# Patient Record
Sex: MALE | Race: BLACK OR AFRICAN AMERICAN | Employment: FULL TIME | URBAN - METROPOLITAN AREA
[De-identification: names, ages, dates, MRNs, and addresses within clinical notes are randomized per-mention and may not be internally consistent; named-entity substitution may affect disease eponyms.]

---

## 2017-08-01 ENCOUNTER — OFFICE VISIT (OUTPATIENT)
Dept: FAMILY MEDICINE CLINIC | Age: 47
End: 2017-08-01
Payer: COMMERCIAL

## 2017-08-01 VITALS
TEMPERATURE: 98.5 F | HEART RATE: 72 BPM | HEIGHT: 67 IN | BODY MASS INDEX: 38.45 KG/M2 | RESPIRATION RATE: 20 BRPM | SYSTOLIC BLOOD PRESSURE: 124 MMHG | WEIGHT: 245 LBS | DIASTOLIC BLOOD PRESSURE: 90 MMHG

## 2017-08-01 DIAGNOSIS — I10 ESSENTIAL HYPERTENSION: ICD-10-CM

## 2017-08-01 DIAGNOSIS — Z13.220 SCREENING CHOLESTEROL LEVEL: ICD-10-CM

## 2017-08-01 DIAGNOSIS — Z12.5 SCREENING FOR PROSTATE CANCER: ICD-10-CM

## 2017-08-01 DIAGNOSIS — M79.651 RIGHT THIGH PAIN: Primary | ICD-10-CM

## 2017-08-01 PROCEDURE — 90715 TDAP VACCINE 7 YRS/> IM: CPT | Performed by: NURSE PRACTITIONER

## 2017-08-01 PROCEDURE — 90471 IMMUNIZATION ADMIN: CPT | Performed by: NURSE PRACTITIONER

## 2017-08-01 PROCEDURE — 99203 OFFICE O/P NEW LOW 30 MIN: CPT | Performed by: NURSE PRACTITIONER

## 2017-08-01 ASSESSMENT — PATIENT HEALTH QUESTIONNAIRE - PHQ9
1. LITTLE INTEREST OR PLEASURE IN DOING THINGS: 0
2. FEELING DOWN, DEPRESSED OR HOPELESS: 1
SUM OF ALL RESPONSES TO PHQ QUESTIONS 1-9: 1
SUM OF ALL RESPONSES TO PHQ9 QUESTIONS 1 & 2: 1

## 2017-08-07 ENCOUNTER — HOSPITAL ENCOUNTER (OUTPATIENT)
Dept: GENERAL RADIOLOGY | Age: 47
Discharge: HOME | End: 2017-08-07
Payer: COMMERCIAL

## 2017-08-07 ENCOUNTER — HOSPITAL ENCOUNTER (OUTPATIENT)
Age: 47
Discharge: HOME | End: 2017-08-07
Payer: COMMERCIAL

## 2017-08-07 DIAGNOSIS — I10 ESSENTIAL HYPERTENSION: ICD-10-CM

## 2017-08-07 DIAGNOSIS — M79.651 RIGHT THIGH PAIN: ICD-10-CM

## 2017-08-07 DIAGNOSIS — Z13.220 SCREENING CHOLESTEROL LEVEL: ICD-10-CM

## 2017-08-07 DIAGNOSIS — Z12.5 SCREENING FOR PROSTATE CANCER: ICD-10-CM

## 2017-08-07 LAB
ALBUMIN SERPL-MCNC: 3.9 G/DL (ref 3.5–5.1)
ALP BLD-CCNC: 62 U/L (ref 38–126)
ALT SERPL-CCNC: 14 U/L (ref 11–66)
ANION GAP SERPL CALCULATED.3IONS-SCNC: 8 MEQ/L (ref 8–16)
AST SERPL-CCNC: 13 U/L (ref 5–40)
BILIRUB SERPL-MCNC: 0.4 MG/DL (ref 0.3–1.2)
BUN BLDV-MCNC: 16 MG/DL (ref 7–22)
CALCIUM SERPL-MCNC: 9 MG/DL (ref 8.5–10.5)
CHLORIDE BLD-SCNC: 104 MEQ/L (ref 98–111)
CHOLESTEROL, TOTAL: 150 MG/DL (ref 100–199)
CO2: 28 MEQ/L (ref 23–33)
CREAT SERPL-MCNC: 0.8 MG/DL (ref 0.4–1.2)
GFR SERPL CREATININE-BSD FRML MDRD: > 90 ML/MIN/1.73M2
GLUCOSE BLD-MCNC: 96 MG/DL (ref 70–108)
HDLC SERPL-MCNC: 32 MG/DL
LDL CHOLESTEROL CALCULATED: 93 MG/DL
POTASSIUM SERPL-SCNC: 4.5 MEQ/L (ref 3.5–5.2)
PROSTATE SPECIFIC ANTIGEN: 0.58 NG/ML (ref 0–1)
SODIUM BLD-SCNC: 140 MEQ/L (ref 135–145)
TOTAL PROTEIN: 7.4 G/DL (ref 6.1–8)
TRIGL SERPL-MCNC: 126 MG/DL (ref 0–199)

## 2017-08-07 PROCEDURE — 80053 COMPREHEN METABOLIC PANEL: CPT

## 2017-08-07 PROCEDURE — 36415 COLL VENOUS BLD VENIPUNCTURE: CPT

## 2017-08-07 PROCEDURE — 80061 LIPID PANEL: CPT

## 2017-08-07 PROCEDURE — 73552 X-RAY EXAM OF FEMUR 2/>: CPT

## 2017-08-07 PROCEDURE — 84153 ASSAY OF PSA TOTAL: CPT

## 2017-08-08 ENCOUNTER — TELEPHONE (OUTPATIENT)
Dept: FAMILY MEDICINE CLINIC | Age: 47
End: 2017-08-08

## 2019-07-10 ENCOUNTER — HOSPITAL ENCOUNTER (EMERGENCY)
Age: 49
Discharge: HOME | End: 2019-07-10
Attending: EMERGENCY MEDICINE
Payer: COMMERCIAL

## 2019-07-10 VITALS
WEIGHT: 245 LBS | HEIGHT: 67 IN | OXYGEN SATURATION: 96 % | SYSTOLIC BLOOD PRESSURE: 150 MMHG | DIASTOLIC BLOOD PRESSURE: 80 MMHG | HEART RATE: 83 BPM | RESPIRATION RATE: 16 BRPM | BODY MASS INDEX: 38.45 KG/M2 | TEMPERATURE: 97.2 F

## 2019-07-10 DIAGNOSIS — L23.7 ALLERGIC CONTACT DERMATITIS DUE TO PLANT: Primary | ICD-10-CM

## 2019-07-10 PROCEDURE — 99213 OFFICE O/P EST LOW 20 MIN: CPT | Performed by: EMERGENCY MEDICINE

## 2019-07-10 PROCEDURE — 99212 OFFICE O/P EST SF 10 MIN: CPT

## 2019-07-10 RX ORDER — HYDROXYZINE 50 MG/1
50 TABLET, FILM COATED ORAL 4 TIMES DAILY PRN
Qty: 15 TABLET | Refills: 0 | Status: SHIPPED | OUTPATIENT
Start: 2019-07-10 | End: 2019-08-15

## 2019-07-10 RX ORDER — PREDNISONE 20 MG/1
20 TABLET ORAL DAILY
Qty: 18 TABLET | Refills: 0 | Status: SHIPPED | OUTPATIENT
Start: 2019-07-10 | End: 2019-07-26

## 2019-07-10 ASSESSMENT — ENCOUNTER SYMPTOMS
NAUSEA: 0
EYE DISCHARGE: 0
STRIDOR: 0
WHEEZING: 0
SHORTNESS OF BREATH: 0
VOICE CHANGE: 0
SORE THROAT: 0
VOMITING: 0
ABDOMINAL PAIN: 0
TROUBLE SWALLOWING: 0
SINUS PRESSURE: 0
EYE REDNESS: 0
DIARRHEA: 0
COUGH: 0
EYE PAIN: 0
BACK PAIN: 0

## 2019-07-10 NOTE — ED PROVIDER NOTES
MEDICATIONS       Discharge Medication List as of 7/10/2019 12:07 PM          ALLERGIES     Patient is has No Known Allergies. FAMILY HISTORY     Patient'sfamily history includes Diabetes in his mother and another family member; High Blood Pressure in his mother. SOCIAL HISTORY     Patient  reports that he has never smoked. He has never used smokeless tobacco. He reports that he does not drink alcohol or use drugs. PHYSICAL EXAM     ED TRIAGE VITALS  BP: (!) 150/80, Temp: 97.2 °F (36.2 °C), Pulse: 83, Resp: 16, SpO2: 96 %  Physical Exam   Constitutional: He is oriented to person, place, and time. He appears well-developed and well-nourished. No distress. Moist membranes, normal airway   HENT:   Head: Normocephalic and atraumatic. Right Ear: Tympanic membrane and external ear normal.   Left Ear: Tympanic membrane and external ear normal.   Nose: Nose normal. Right sinus exhibits no maxillary sinus tenderness and no frontal sinus tenderness. Left sinus exhibits no maxillary sinus tenderness and no frontal sinus tenderness. Mouth/Throat: Oropharynx is clear and moist. No trismus in the jaw. No uvula swelling. No oropharyngeal exudate, posterior oropharyngeal edema, posterior oropharyngeal erythema or tonsillar abscesses. Eyes: Pupils are equal, round, and reactive to light. Conjunctivae and EOM are normal. Right eye exhibits no discharge. Left eye exhibits no discharge. Right conjunctiva is not injected. Left conjunctiva is not injected. No scleral icterus. Right eye exhibits normal extraocular motion. Left eye exhibits normal extraocular motion. Conjunctiva clear   Neck: Normal range of motion. No JVD present. No thyromegaly present. No meningismus   Cardiovascular: Normal rate, regular rhythm, S1 normal, S2 normal, normal heart sounds, intact distal pulses and normal pulses. Exam reveals no gallop and no friction rub. No murmur heard.   Pulmonary/Chest: Effort normal and breath sounds normal. No has allergic contact dermatitis due to plant. Will require topical and systemic corticosteroids and antihistamines. Will treat with prednisone, Atarax, Valisone, increased oral clear liquids, rest in cool air conditioned space. Patient to recheck with PCP in 7 days if problems persist, and he was given PCP referral number per his request.  He understands to go to ED if worse. PATIENT REFERRED TO:  Recheck if problems persist    Schedule an appointment as soon as possible for a visit in 7 days  reCheck in office if problems persist, use referral number. Go to emergency if worse    DISCHARGE MEDICATIONS:  Discharge Medication List as of 7/10/2019 12:07 PM      START taking these medications    Details   predniSONE (DELTASONE) 20 MG tablet Take 1 tablet by mouth daily for 16 days 2 p.o. daily for 5 days, 1 p.o. daily for 5 days, one half p.o. daily for 6 days, Disp-18 tablet, R-0Print      betamethasone valerate (VALISONE) 0.1 % cream Apply topically 3 times daily.   Do not apply to face, Disp-45 g, R-0, Print      hydrOXYzine (ATARAX) 50 MG tablet Take 1 tablet by mouth 4 times daily as needed for Itching (redness and swelling) Caution not at work will cause drowsiness, Disp-15 tablet, R-0Print           Discharge Medication List as of 7/10/2019 12:07 PM          MD Ta Cadet MD  07/10/19 [address] Cristian Jang MD  07/10/19 [id number]

## 2019-07-15 ENCOUNTER — OFFICE VISIT (OUTPATIENT)
Dept: FAMILY MEDICINE CLINIC | Age: 49
End: 2019-07-15
Payer: COMMERCIAL

## 2019-07-15 VITALS
HEART RATE: 94 BPM | BODY MASS INDEX: 39.39 KG/M2 | DIASTOLIC BLOOD PRESSURE: 100 MMHG | RESPIRATION RATE: 20 BRPM | SYSTOLIC BLOOD PRESSURE: 146 MMHG | TEMPERATURE: 99.4 F | WEIGHT: 251 LBS | HEIGHT: 67 IN

## 2019-07-15 DIAGNOSIS — I10 ESSENTIAL HYPERTENSION: Primary | ICD-10-CM

## 2019-07-15 PROCEDURE — 99213 OFFICE O/P EST LOW 20 MIN: CPT | Performed by: NURSE PRACTITIONER

## 2019-07-15 RX ORDER — M-VIT,TX,IRON,MINS/CALC/FOLIC 27MG-0.4MG
1 TABLET ORAL DAILY
COMMUNITY
End: 2019-11-06

## 2019-07-15 RX ORDER — LISINOPRIL 10 MG/1
10 TABLET ORAL DAILY
Qty: 30 TABLET | Refills: 3 | Status: SHIPPED | OUTPATIENT
Start: 2019-07-15 | End: 2019-08-15 | Stop reason: SDUPTHER

## 2019-07-15 ASSESSMENT — PATIENT HEALTH QUESTIONNAIRE - PHQ9
1. LITTLE INTEREST OR PLEASURE IN DOING THINGS: 1
SUM OF ALL RESPONSES TO PHQ9 QUESTIONS 1 & 2: 2
SUM OF ALL RESPONSES TO PHQ QUESTIONS 1-9: 2
SUM OF ALL RESPONSES TO PHQ QUESTIONS 1-9: 2
2. FEELING DOWN, DEPRESSED OR HOPELESS: 1

## 2019-07-15 NOTE — PATIENT INSTRUCTIONS
Patient Education        DASH Diet: Care Instructions  Your Care Instructions    The DASH diet is an eating plan that can help lower your blood pressure. DASH stands for Dietary Approaches to Stop Hypertension. Hypertension is high blood pressure. The DASH diet focuses on eating foods that are high in calcium, potassium, and magnesium. These nutrients can lower blood pressure. The foods that are highest in these nutrients are fruits, vegetables, low-fat dairy products, nuts, seeds, and legumes. But taking calcium, potassium, and magnesium supplements instead of eating foods that are high in those nutrients does not have the same effect. The DASH diet also includes whole grains, fish, and poultry. The DASH diet is one of several lifestyle changes your doctor may recommend to lower your high blood pressure. Your doctor may also want you to decrease the amount of sodium in your diet. Lowering sodium while following the DASH diet can lower blood pressure even further than just the DASH diet alone. Follow-up care is a key part of your treatment and safety. Be sure to make and go to all appointments, and call your doctor if you are having problems. It's also a good idea to know your test results and keep a list of the medicines you take. How can you care for yourself at home? Following the DASH diet  · Eat 4 to 5 servings of fruit each day. A serving is 1 medium-sized piece of fruit, ½ cup chopped or canned fruit, 1/4 cup dried fruit, or 4 ounces (½ cup) of fruit juice. Choose fruit more often than fruit juice. · Eat 4 to 5 servings of vegetables each day. A serving is 1 cup of lettuce or raw leafy vegetables, ½ cup of chopped or cooked vegetables, or 4 ounces (½ cup) of vegetable juice. Choose vegetables more often than vegetable juice. · Get 2 to 3 servings of low-fat and fat-free dairy each day. A serving is 8 ounces of milk, 1 cup of yogurt, or 1 ½ ounces of cheese.   · Eat 6 to 8 servings of grains each think clearly.     · You have new or more blood in your urine.     · You have new swelling.     · You are dizzy or lightheaded, or you feel like you may faint.    Watch closely for changes in your health, and be sure to contact your doctor if:    · You do not get better as expected. Where can you learn more? Go to [email or web address]://[email or web address] org and sign in to your Anam Mobile account. Enter N656 in the Local Funeral box to learn more about \"Kidney Disease and High Blood Pressure: Care Instructions. \"     If you do not have an account, please click on the \"Sign Up Now\" link. Current as of: October 31, 2018  Content Version: 12.0  © 5505-1148 Healthwise, Incorporated. Care instructions adapted under license by [facility] ([facility]). If you have questions about a medical condition or this instruction, always ask your healthcare professional. [address] any warranty or liability for your use of this information. Patient Education        Learning About High Blood Pressure  What is high blood pressure? Blood pressure is a measure of how hard the blood pushes against the walls of your arteries. It's normal for blood pressure to go up and down throughout the day, but if it stays up, you have high blood pressure. Another name for high blood pressure is hypertension. Two numbers tell you your blood pressure. The first number is the systolic pressure. It shows how hard the blood pushes when your heart is pumping. The second number is the diastolic pressure. It shows how hard the blood pushes between heartbeats, when your heart is relaxed and filling with blood. Your doctor will give you a goal for your blood pressure. Your goal will be based on your health and your age. High blood pressure (hypertension) means that the top number stays high, or the bottom number stays high, or both. High blood pressure increases the risk of stroke, heart attack, and other problems.  You and your doctor will talk about your risks of these problems based on your blood pressure. What happens when you have high blood pressure? · Blood flows through your arteries with too much force. Over time, this damages the walls of your arteries. But you can't feel it. High blood pressure usually doesn't cause symptoms. · Fat and calcium start to build up in your arteries. This buildup is called plaque. Plaque makes your arteries narrower and stiffer. Blood can't flow through them as easily. · This lack of good blood flow starts to damage some of the organs in your body. This can lead to problems such as coronary artery disease and heart attack, heart failure, stroke, kidney failure, and eye damage. How can you prevent high blood pressure? · Stay at a healthy weight. · Try to limit how much sodium you eat to less than 2,300 milligrams (mg) a day. If you limit your sodium to 1,500 mg a day, you can lower your blood pressure even more. ? Buy foods that are labeled \"unsalted,\" \"sodium-free,\" or \"low-sodium. \" Foods labeled \"reduced-sodium\" and \"light sodium\" may still have too much sodium. ? Flavor your food with garlic, lemon juice, onion, vinegar, herbs, and spices instead of salt. Do not use soy sauce, steak sauce, onion salt, garlic salt, mustard, or ketchup on your food. ? Use less salt (or none) when recipes call for it. You can often use half the salt a recipe calls for without losing flavor. · Be physically active. Get at least 30 minutes of exercise on most days of the week. Walking is a good choice. You also may want to do other activities, such as running, swimming, cycling, or playing tennis or team sports. · Limit alcohol to 2 drinks a day for men and 1 drink a day for women. · Eat plenty of fruits, vegetables, and low-fat dairy products. Eat less saturated and total fats. How is high blood pressure treated? · Your doctor will suggest making lifestyle changes to help your heart.  For example, your doctor may ask you to eat healthy foods, quit smoking, lose extra weight, and be more active. · If lifestyle changes don't help enough, your doctor may recommend that you take medicine. · When blood pressure is very high, medicines are needed to lower it. Follow-up care is a key part of your treatment and safety. Be sure to make and go to all appointments, and call your doctor if you are having problems. It's also a good idea to know your test results and keep a list of the medicines you take. Where can you learn more? Go to https://Zykispepiceweb.[email or web address] org and sign in to your Gynesonics account. Enter P501 in the Weathermob box to learn more about \"Learning About High Blood Pressure. \"     If you do not have an account, please click on the \"Sign Up Now\" link. Current as of: July 22, 2018  Content Version: 12.0  © 9056-7142 Healthwise, Incorporated. Care instructions adapted under license by [facility] ([facility]). If you have questions about a medical condition or this instruction, always ask your healthcare professional. [place] [address] any warranty or liability for your use of this information.

## 2019-07-23 ASSESSMENT — ENCOUNTER SYMPTOMS
EYE PAIN: 0
CHEST TIGHTNESS: 0
BACK PAIN: 0
COUGH: 0
ABDOMINAL PAIN: 0
EYE DISCHARGE: 0
ABDOMINAL DISTENTION: 0
SHORTNESS OF BREATH: 0
RHINORRHEA: 0
WHEEZING: 0
APNEA: 0
PHOTOPHOBIA: 0

## 2019-07-23 NOTE — PROGRESS NOTES
[address] [address] [address]  [address] [facility] [address]  [facility] [address]  Dept: [phone]  Dept Fax: [phone]  Loc: [phone]  PROGRESS NOTE      VisitDate: 7/15/2019    Lilibeth Schuler is a 46 y.o. male who presents today for:     Chief Complaint   Patient presents with    Hypertension     Went to the ER for poison ivy, while there his BP was elevated and they told him to follow up here. He did buy a BP monitor and has been checking it at home. 150/80, 164/95, 160/98, 166/101         Subjective:  Patient presents with complaint of elevated blood pressure 160s over 100s. Patient was recently treated for poison ivy in the emergency room and was told to follow-up with me regarding blood pressure concerns. Denies any headache, dizziness, chest pain, shortness of breath, edema. Review of Systems   Constitutional: Negative for activity change, appetite change, chills, fatigue and fever. HENT: Negative for ear discharge, ear pain, hearing loss, rhinorrhea and tinnitus. Eyes: Negative for photophobia, pain, discharge and visual disturbance. Respiratory: Negative for apnea, cough, chest tightness, shortness of breath and wheezing. Cardiovascular: Negative for chest pain, palpitations and leg swelling. Gastrointestinal: Negative for abdominal distention and abdominal pain. Genitourinary: Negative for decreased urine volume, difficulty urinating, discharge, dysuria, flank pain, frequency, hematuria, scrotal swelling, testicular pain and urgency. Musculoskeletal: Negative for arthralgias, back pain, joint swelling, myalgias and neck pain. Skin: Negative. Neurological: Negative for dizziness, tremors, light-headedness, numbness and headaches. Hematological: Negative for adenopathy. Psychiatric/Behavioral: Negative for confusion. All other systems reviewed and are negative. History reviewed. No pertinent past medical history.    History ear canal normal. No drainage or tenderness. Tympanic membrane is not injected, not scarred, not perforated and not bulging. No middle ear effusion. No hemotympanum. Nose: No mucosal edema, rhinorrhea, nasal deformity or septal deviation. No epistaxis. No foreign bodies. Right sinus exhibits no maxillary sinus tenderness and no frontal sinus tenderness. Left sinus exhibits no maxillary sinus tenderness and no frontal sinus tenderness. Mouth/Throat: Oropharynx is clear and moist and mucous membranes are normal. He does not have dentures. No oral lesions. Normal dentition. No dental abscesses or dental caries. No oropharyngeal exudate, posterior oropharyngeal edema, posterior oropharyngeal erythema or tonsillar abscesses. Eyes: Conjunctivae, EOM and lids are normal. Right conjunctiva is not injected. Left conjunctiva is not injected. No scleral icterus. Right eye exhibits normal extraocular motion. Left eye exhibits normal extraocular motion. Neck: Trachea normal and normal range of motion. Neck supple. No JVD present. Carotid bruit is not present. No edema, no erythema and normal range of motion present. No thyroid mass and no thyromegaly present. Cardiovascular: Normal rate, regular rhythm, S1 normal, S2 normal, normal heart sounds and intact distal pulses. Exam reveals no gallop and no friction rub. No murmur heard. Pulmonary/Chest: Effort normal and breath sounds normal. No respiratory distress. He has no wheezes. He has no rhonchi. He has no rales. He exhibits no tenderness. Abdominal: Soft. Normal appearance and bowel sounds are normal. He exhibits no mass. There is no hepatosplenomegaly, splenomegaly or hepatomegaly. There is no rebound and no guarding. No hernia. Hernia confirmed negative in the ventral area, confirmed negative in the right inguinal area and confirmed negative in the left inguinal area. Musculoskeletal: Normal range of motion. He exhibits no edema or tenderness.

## 2019-08-10 ENCOUNTER — HOSPITAL ENCOUNTER (OUTPATIENT)
Age: 49
Discharge: HOME | End: 2019-08-10
Payer: COMMERCIAL

## 2019-08-10 DIAGNOSIS — I10 ESSENTIAL HYPERTENSION: ICD-10-CM

## 2019-08-10 LAB
ALBUMIN SERPL-MCNC: 4 G/DL (ref 3.5–5.1)
ALP BLD-CCNC: 64 U/L (ref 38–126)
ALT SERPL-CCNC: 14 U/L (ref 11–66)
ANION GAP SERPL CALCULATED.3IONS-SCNC: 8 MEQ/L (ref 8–16)
AST SERPL-CCNC: 17 U/L (ref 5–40)
BILIRUB SERPL-MCNC: 0.4 MG/DL (ref 0.3–1.2)
BUN BLDV-MCNC: 13 MG/DL (ref 7–22)
CALCIUM SERPL-MCNC: 8.8 MG/DL (ref 8.5–10.5)
CHLORIDE BLD-SCNC: 105 MEQ/L (ref 98–111)
CHOLESTEROL, TOTAL: 153 MG/DL (ref 100–199)
CO2: 28 MEQ/L (ref 23–33)
CREAT SERPL-MCNC: 0.8 MG/DL (ref 0.4–1.2)
GFR SERPL CREATININE-BSD FRML MDRD: > 90 ML/MIN/1.73M2
GLUCOSE BLD-MCNC: 106 MG/DL (ref 70–108)
HDLC SERPL-MCNC: 38 MG/DL
LDL CHOLESTEROL CALCULATED: 96 MG/DL
POTASSIUM SERPL-SCNC: 4.8 MEQ/L (ref 3.5–5.2)
SODIUM BLD-SCNC: 141 MEQ/L (ref 135–145)
TOTAL PROTEIN: 7.2 G/DL (ref 6.1–8)
TRIGL SERPL-MCNC: 94 MG/DL (ref 0–199)

## 2019-08-10 PROCEDURE — 80061 LIPID PANEL: CPT

## 2019-08-10 PROCEDURE — 80053 COMPREHEN METABOLIC PANEL: CPT

## 2019-08-10 PROCEDURE — 36415 COLL VENOUS BLD VENIPUNCTURE: CPT

## 2019-08-15 ENCOUNTER — OFFICE VISIT (OUTPATIENT)
Dept: FAMILY MEDICINE CLINIC | Age: 49
End: 2019-08-15
Payer: COMMERCIAL

## 2019-08-15 VITALS
DIASTOLIC BLOOD PRESSURE: 88 MMHG | HEART RATE: 56 BPM | RESPIRATION RATE: 12 BRPM | TEMPERATURE: 98.9 F | SYSTOLIC BLOOD PRESSURE: 148 MMHG | BODY MASS INDEX: 39.61 KG/M2 | WEIGHT: 251 LBS

## 2019-08-15 DIAGNOSIS — I10 ESSENTIAL HYPERTENSION: Primary | ICD-10-CM

## 2019-08-15 PROCEDURE — 99213 OFFICE O/P EST LOW 20 MIN: CPT | Performed by: NURSE PRACTITIONER

## 2019-08-15 RX ORDER — LISINOPRIL 20 MG/1
20 TABLET ORAL DAILY
Qty: 30 TABLET | Refills: 3 | Status: SHIPPED | OUTPATIENT
Start: 2019-08-15 | End: 2019-09-16 | Stop reason: SDUPTHER

## 2019-08-19 ASSESSMENT — ENCOUNTER SYMPTOMS
RHINORRHEA: 0
CHEST TIGHTNESS: 0
ABDOMINAL DISTENTION: 0
BACK PAIN: 0
PHOTOPHOBIA: 0
COUGH: 0
EYE DISCHARGE: 0
SHORTNESS OF BREATH: 0
ABDOMINAL PAIN: 0
EYE PAIN: 0
APNEA: 0
WHEEZING: 0

## 2019-08-19 NOTE — PROGRESS NOTES
Eyes: Conjunctivae, EOM and lids are normal. Right conjunctiva is not injected. Left conjunctiva is not injected. No scleral icterus. Right eye exhibits normal extraocular motion. Left eye exhibits normal extraocular motion. Neck: Trachea normal and normal range of motion. Neck supple. No JVD present. Carotid bruit is not present. No edema, no erythema and normal range of motion present. No thyroid mass and no thyromegaly present. Cardiovascular: Normal rate, regular rhythm, S1 normal, S2 normal, normal heart sounds and intact distal pulses. Exam reveals no gallop and no friction rub. No murmur heard. Pulmonary/Chest: Effort normal and breath sounds normal. No respiratory distress. He has no wheezes. He has no rhonchi. He has no rales. He exhibits no tenderness. Abdominal: Soft. Normal appearance and bowel sounds are normal. He exhibits no mass. There is no hepatosplenomegaly, splenomegaly or hepatomegaly. There is no rebound and no guarding. No hernia. Hernia confirmed negative in the ventral area, confirmed negative in the right inguinal area and confirmed negative in the left inguinal area. Musculoskeletal: Normal range of motion. He exhibits no edema or tenderness. Neurological: He is alert and oriented to person, place, and time. He displays normal reflexes. No cranial nerve deficit. He exhibits normal muscle tone. Coordination normal.   Skin: Skin is warm, dry and intact. No bruising, no ecchymosis, no laceration, no lesion and no rash noted. He is not diaphoretic. No cyanosis. No pallor. Nails show no clubbing. Psychiatric: He has a normal mood and affect. His speech is normal and behavior is normal. Judgment and thought content normal. Cognition and memory are normal.   Nursing note and vitals reviewed. BP (!) 148/88   Pulse 56   Temp 98.9 °F (37.2 °C) (Oral)   Resp 12   Wt 251 lb (113.9 kg)   BMI 39.61 kg/m²       Impression/Plan:  No diagnosis found.   Requested Prescriptions Signed Prescriptions Disp Refills    lisinopril (PRINIVIL;ZESTRIL) 20 MG tablet 30 tablet 3     Sig: Take 1 tablet by mouth daily     No orders of the defined types were placed in this encounter. Patient giveneducational materials - see patient instructions. Discussed use, benefit, and side effects of prescribed medications. All patient questions answered. Pt voiced understanding. Reviewed health maintenance. Patient agreedwith treatment plan. Follow up as directed.           Electronically signed by JUAN Covarrubias CNP on 8/19/2019 at 1:07 PM

## 2019-09-16 ENCOUNTER — OFFICE VISIT (OUTPATIENT)
Dept: FAMILY MEDICINE CLINIC | Age: 49
End: 2019-09-16
Payer: COMMERCIAL

## 2019-09-16 VITALS
SYSTOLIC BLOOD PRESSURE: 138 MMHG | BODY MASS INDEX: 39.89 KG/M2 | TEMPERATURE: 98.5 F | WEIGHT: 252.8 LBS | HEART RATE: 72 BPM | DIASTOLIC BLOOD PRESSURE: 90 MMHG | RESPIRATION RATE: 18 BRPM

## 2019-09-16 DIAGNOSIS — I10 ESSENTIAL HYPERTENSION: Primary | ICD-10-CM

## 2019-09-16 PROCEDURE — 99213 OFFICE O/P EST LOW 20 MIN: CPT | Performed by: NURSE PRACTITIONER

## 2019-09-16 RX ORDER — AMLODIPINE BESYLATE 5 MG/1
5 TABLET ORAL DAILY
Qty: 90 TABLET | Refills: 3 | Status: SHIPPED | OUTPATIENT
Start: 2019-09-16 | End: 2020-07-08 | Stop reason: SDUPTHER

## 2019-09-16 RX ORDER — LISINOPRIL 20 MG/1
20 TABLET ORAL DAILY
Qty: 90 TABLET | Refills: 3 | Status: SHIPPED | OUTPATIENT
Start: 2019-09-16 | End: 2020-01-07 | Stop reason: SDUPTHER

## 2019-09-26 ASSESSMENT — ENCOUNTER SYMPTOMS
ABDOMINAL PAIN: 0
SHORTNESS OF BREATH: 0
BACK PAIN: 0
RHINORRHEA: 0
APNEA: 0
EYE PAIN: 0
PHOTOPHOBIA: 0
CHEST TIGHTNESS: 0
EYE DISCHARGE: 0
COUGH: 0
WHEEZING: 0
ABDOMINAL DISTENTION: 0

## 2019-11-06 ENCOUNTER — HOSPITAL ENCOUNTER (EMERGENCY)
Age: 49
Discharge: HOME | End: 2019-11-06
Attending: EMERGENCY MEDICINE
Payer: COMMERCIAL

## 2019-11-06 ENCOUNTER — APPOINTMENT (OUTPATIENT)
Dept: GENERAL RADIOLOGY | Age: 49
End: 2019-11-06
Payer: COMMERCIAL

## 2019-11-06 VITALS
RESPIRATION RATE: 17 BRPM | DIASTOLIC BLOOD PRESSURE: 102 MMHG | WEIGHT: 240 LBS | TEMPERATURE: 97.7 F | SYSTOLIC BLOOD PRESSURE: 159 MMHG | BODY MASS INDEX: 37.67 KG/M2 | HEIGHT: 67 IN | OXYGEN SATURATION: 98 % | HEART RATE: 61 BPM

## 2019-11-06 DIAGNOSIS — G89.29 ACUTE EXACERBATION OF CHRONIC LOW BACK PAIN: Primary | ICD-10-CM

## 2019-11-06 DIAGNOSIS — M54.50 ACUTE EXACERBATION OF CHRONIC LOW BACK PAIN: Primary | ICD-10-CM

## 2019-11-06 PROCEDURE — 6360000002 HC RX W HCPCS: Performed by: EMERGENCY MEDICINE

## 2019-11-06 PROCEDURE — 96372 THER/PROPH/DIAG INJ SC/IM: CPT

## 2019-11-06 PROCEDURE — 72100 X-RAY EXAM L-S SPINE 2/3 VWS: CPT

## 2019-11-06 PROCEDURE — 99283 EMERGENCY DEPT VISIT LOW MDM: CPT

## 2019-11-06 RX ORDER — KETOROLAC TROMETHAMINE 30 MG/ML
30 INJECTION, SOLUTION INTRAMUSCULAR; INTRAVENOUS ONCE
Status: COMPLETED | OUTPATIENT
Start: 2019-11-06 | End: 2019-11-06

## 2019-11-06 RX ORDER — IBUPROFEN 400 MG/1
400 TABLET ORAL EVERY 6 HOURS PRN
Qty: 40 TABLET | Refills: 0 | Status: SHIPPED | OUTPATIENT
Start: 2019-11-06 | End: 2021-06-03 | Stop reason: SDUPTHER

## 2019-11-06 RX ORDER — METHYLPREDNISOLONE SODIUM SUCCINATE 125 MG/2ML
80 INJECTION, POWDER, LYOPHILIZED, FOR SOLUTION INTRAMUSCULAR; INTRAVENOUS ONCE
Status: COMPLETED | OUTPATIENT
Start: 2019-11-06 | End: 2019-11-06

## 2019-11-06 RX ORDER — ORPHENADRINE CITRATE 30 MG/ML
60 INJECTION INTRAMUSCULAR; INTRAVENOUS ONCE
Status: COMPLETED | OUTPATIENT
Start: 2019-11-06 | End: 2019-11-06

## 2019-11-06 RX ORDER — CYCLOBENZAPRINE HCL 5 MG
5 TABLET ORAL 2 TIMES DAILY PRN
Qty: 10 TABLET | Refills: 0 | Status: SHIPPED | OUTPATIENT
Start: 2019-11-06 | End: 2019-11-16

## 2019-11-06 RX ADMIN — ORPHENADRINE CITRATE 60 MG: 30 INJECTION INTRAMUSCULAR; INTRAVENOUS at 05:41

## 2019-11-06 RX ADMIN — KETOROLAC TROMETHAMINE 30 MG: 30 INJECTION, SOLUTION INTRAMUSCULAR at 05:40

## 2019-11-06 RX ADMIN — METHYLPREDNISOLONE SODIUM SUCCINATE 80 MG: 125 INJECTION, POWDER, FOR SOLUTION INTRAMUSCULAR; INTRAVENOUS at 05:40

## 2019-11-06 ASSESSMENT — ENCOUNTER SYMPTOMS
COUGH: 0
VOMITING: 0
SORE THROAT: 0
CONSTIPATION: 0
ABDOMINAL PAIN: 0
PHOTOPHOBIA: 0
SHORTNESS OF BREATH: 0
TROUBLE SWALLOWING: 0
WHEEZING: 0
ABDOMINAL DISTENTION: 0
CHEST TIGHTNESS: 0
DIARRHEA: 0
EYE REDNESS: 0
CHOKING: 0
NAUSEA: 0
EYE ITCHING: 0
EYE PAIN: 0
BLOOD IN STOOL: 0
EYE DISCHARGE: 0
BACK PAIN: 1
VOICE CHANGE: 0
SINUS PRESSURE: 0
RHINORRHEA: 0

## 2019-11-06 ASSESSMENT — PAIN SCALES - GENERAL: PAINLEVEL_OUTOF10: 10

## 2019-11-06 ASSESSMENT — PAIN DESCRIPTION - PAIN TYPE: TYPE: ACUTE PAIN

## 2019-11-06 ASSESSMENT — PAIN DESCRIPTION - ORIENTATION: ORIENTATION: LOWER;RIGHT

## 2019-11-06 ASSESSMENT — PAIN DESCRIPTION - LOCATION: LOCATION: BACK

## 2019-11-07 ENCOUNTER — TELEPHONE (OUTPATIENT)
Dept: FAMILY MEDICINE CLINIC | Age: 49
End: 2019-11-07

## 2020-01-07 ENCOUNTER — OFFICE VISIT (OUTPATIENT)
Dept: FAMILY MEDICINE CLINIC | Age: 50
End: 2020-01-07
Payer: COMMERCIAL

## 2020-01-07 VITALS
HEIGHT: 67 IN | WEIGHT: 254.4 LBS | SYSTOLIC BLOOD PRESSURE: 118 MMHG | BODY MASS INDEX: 39.93 KG/M2 | RESPIRATION RATE: 16 BRPM | HEART RATE: 78 BPM | TEMPERATURE: 98.6 F | DIASTOLIC BLOOD PRESSURE: 84 MMHG

## 2020-01-07 PROCEDURE — 99214 OFFICE O/P EST MOD 30 MIN: CPT | Performed by: NURSE PRACTITIONER

## 2020-01-07 RX ORDER — LISINOPRIL 20 MG/1
20 TABLET ORAL DAILY
Qty: 90 TABLET | Refills: 3 | Status: SHIPPED | OUTPATIENT
Start: 2020-01-07 | End: 2020-01-23

## 2020-01-07 ASSESSMENT — PATIENT HEALTH QUESTIONNAIRE - PHQ9
SUM OF ALL RESPONSES TO PHQ QUESTIONS 1-9: 0
1. LITTLE INTEREST OR PLEASURE IN DOING THINGS: 0
SUM OF ALL RESPONSES TO PHQ9 QUESTIONS 1 & 2: 0
2. FEELING DOWN, DEPRESSED OR HOPELESS: 0
SUM OF ALL RESPONSES TO PHQ QUESTIONS 1-9: 0

## 2020-01-14 NOTE — PROGRESS NOTES
[address] [place] [facility] [address]  Dept: [phone]  Dept Fax: [phone]  Loc: [phone]  PROGRESS NOTE      VisitDate: 1/7/2020    Kiko Monroy is a 44 y.o. male who presents today for:     Chief Complaint   Patient presents with   Elnoria Spillers     requesting colonoscopy    Medication Refill         Subjective:  Patient presents for routine checkup. Follow-up regarding hypertension. Patient requesting screening colonoscopy. She is. Denies any chest pain, shortness of breath, abdominal pain, rash or edema. Review of Systems   All other systems reviewed and are negative. History reviewed. No pertinent past medical history. History reviewed. No pertinent surgical history. Family History   Problem Relation Age of Onset    Diabetes Other         lot of family members    Diabetes Mother     High Blood Pressure Mother      Social History     Tobacco Use    Smoking status: Never Smoker    Smokeless tobacco: Never Used   Substance Use Topics    Alcohol use: No      Current Outpatient Medications   Medication Sig Dispense Refill    lisinopril (PRINIVIL;ZESTRIL) 20 MG tablet Take 1 tablet by mouth daily 90 tablet 3    amLODIPine (NORVASC) 5 MG tablet Take 1 tablet by mouth daily 90 tablet 3    ibuprofen (IBU) 400 MG tablet Take 1 tablet by mouth every 6 hours as needed for Pain 40 tablet 0     No current facility-administered medications for this visit.       No Known Allergies  Health Maintenance   Topic Date Due    HIV screen  11/14/1983    Diabetes screen  11/14/2008    Shingles Vaccine (1 of 2) 11/14/2018    Colon cancer screen colonoscopy  11/14/2018    Flu vaccine (1) 01/07/2021 (Originally 9/1/2019)    Potassium monitoring  08/10/2020    Creatinine monitoring  08/10/2020    Lipid screen  08/10/2024    DTaP/Tdap/Td vaccine (2 - Td) 08/01/2027    Pneumococcal 0-64 years Vaccine  Aged Leroy Objective:     Physical Exam  Vitals signs and nursing note reviewed. Constitutional:       Appearance: Normal appearance. He is normal weight. HENT:      Head: Normocephalic and atraumatic. Right Ear: Tympanic membrane normal.      Left Ear: Tympanic membrane normal.      Nose: Nose normal.      Mouth/Throat:      Mouth: Mucous membranes are dry. Pharynx: Oropharynx is clear. Eyes:      Extraocular Movements: Extraocular movements intact. Conjunctiva/sclera: Conjunctivae normal.      Pupils: Pupils are equal, round, and reactive to light. Neck:      Musculoskeletal: Normal range of motion and neck supple. Cardiovascular:      Rate and Rhythm: Normal rate and regular rhythm. Pulses: Normal pulses. Heart sounds: Normal heart sounds. Pulmonary:      Effort: Pulmonary effort is normal.      Breath sounds: Normal breath sounds. Abdominal:      General: Bowel sounds are normal.      Palpations: Abdomen is soft. Musculoskeletal: Normal range of motion. Skin:     General: Skin is warm and dry. Capillary Refill: Capillary refill takes 2 to 3 seconds. Neurological:      General: No focal deficit present. Mental Status: He is alert and oriented to person, place, and time. Psychiatric:         Mood and Affect: Mood normal.         Behavior: Behavior normal.         Thought Content: Thought content normal.         Judgment: Judgment normal.       /84   Pulse 78   Temp 98.6 °F (37 °C) (Oral)   Resp 16   Ht 5' 7\" (1.702 m)   Wt 254 lb 6.4 oz (115.4 kg)   BMI 39.84 kg/m²       Impression/Plan:  1. Essential hypertension    2.  Screen for colon cancer      Requested Prescriptions     Signed Prescriptions Disp Refills    lisinopril (PRINIVIL;ZESTRIL) 20 MG tablet 90 tablet 3     Sig: Take 1 tablet by mouth daily     Orders Placed This Encounter   Procedures   Netta Gunderson MD, Gastroenterology, [facility]     Referral Priority:   Routine     Referral Type:   Eval and Treat     Referral Reason:   Specialty Services Required     Referred to Provider:   Jann Hermosillo MD     Requested Specialty:   Gastroenterology     Number of Visits Requested:   1       Patient giveneducational materials - see patient instructions. Discussed use, benefit, and side effects of prescribed medications. All patient questions answered. Pt voiced understanding. Reviewed health maintenance. Patient agreedwith treatment plan. Follow up as directed. Hypertension information provided. Continue medications as prescribed.  Educational information on above diagnosis  provided per AVS.      Electronically signed by JUAN Borden CNP on 1/14/2020 at 10:08 AM

## 2020-01-23 RX ORDER — LISINOPRIL 10 MG/1
10 TABLET ORAL DAILY
Qty: 30 TABLET | Refills: 3 | OUTPATIENT
Start: 2020-01-23

## 2020-01-23 RX ORDER — LISINOPRIL 10 MG/1
10 TABLET ORAL 2 TIMES DAILY
Qty: 60 TABLET | Refills: 3 | Status: SHIPPED | OUTPATIENT
Start: 2020-01-23 | End: 2020-07-08 | Stop reason: SDUPTHER

## 2020-07-09 RX ORDER — AMLODIPINE BESYLATE 5 MG/1
5 TABLET ORAL DAILY
Qty: 90 TABLET | Refills: 1 | Status: SHIPPED | OUTPATIENT
Start: 2020-07-09 | End: 2020-09-04 | Stop reason: SDUPTHER

## 2020-07-09 RX ORDER — LISINOPRIL 10 MG/1
10 TABLET ORAL 2 TIMES DAILY
Qty: 60 TABLET | Refills: 5 | Status: SHIPPED | OUTPATIENT
Start: 2020-07-09 | End: 2020-08-27 | Stop reason: SDUPTHER

## 2020-08-27 RX ORDER — LISINOPRIL 10 MG/1
10 TABLET ORAL 2 TIMES DAILY
Qty: 180 TABLET | Refills: 3 | Status: SHIPPED | OUTPATIENT
Start: 2020-08-27 | End: 2021-06-03 | Stop reason: SDUPTHER

## 2020-09-04 RX ORDER — AMLODIPINE BESYLATE 5 MG/1
5 TABLET ORAL DAILY
Qty: 90 TABLET | Refills: 0 | Status: SHIPPED | OUTPATIENT
Start: 2020-09-04 | End: 2020-09-29 | Stop reason: SDUPTHER

## 2020-09-04 NOTE — TELEPHONE ENCOUNTER
Gregorio Maria called requesting a refill on the following medications:  Requested Prescriptions     Pending Prescriptions Disp Refills    amLODIPine (NORVASC) 5 MG tablet 90 tablet 1     Sig: Take 1 tablet by mouth daily     Pharmacy verified:  [facility] [address]     Date of last visit: 1/7/20  Date of next visit (if applicable): Visit date not found

## 2020-09-29 RX ORDER — AMLODIPINE BESYLATE 5 MG/1
5 TABLET ORAL DAILY
Qty: 90 TABLET | Refills: 0 | Status: SHIPPED | OUTPATIENT
Start: 2020-09-29 | End: 2021-06-03 | Stop reason: SDUPTHER

## 2020-09-29 NOTE — TELEPHONE ENCOUNTER
No future appointments. Recent Visits  Date Type Provider Dept   01/07/20 Office Visit [facility], APRN - CNP [facility]   09/16/19 Office Visit [facility], APRN - CNP [facility]   08/15/19 Office Visit [facility], APRN - CNP [facility]   07/15/19 Office Visit [facility], APRN - CNP [facility]   Showing recent visits within past 540 days with a meds authorizing provider and meeting all other requirements     Future Appointments  No visits were found meeting these conditions.    Showing future appointments within next 150 days with a meds authorizing provider and meeting all other requirements

## 2021-06-03 ENCOUNTER — OFFICE VISIT (OUTPATIENT)
Dept: FAMILY MEDICINE CLINIC | Age: 51
End: 2021-06-03
Payer: COMMERCIAL

## 2021-06-03 VITALS
DIASTOLIC BLOOD PRESSURE: 78 MMHG | HEIGHT: 67 IN | TEMPERATURE: 98.9 F | WEIGHT: 259.4 LBS | HEART RATE: 68 BPM | RESPIRATION RATE: 18 BRPM | SYSTOLIC BLOOD PRESSURE: 138 MMHG | OXYGEN SATURATION: 98 % | BODY MASS INDEX: 40.71 KG/M2

## 2021-06-03 DIAGNOSIS — Z12.5 SCREENING FOR PROSTATE CANCER: ICD-10-CM

## 2021-06-03 DIAGNOSIS — M25.562 ACUTE PAIN OF LEFT KNEE: Primary | ICD-10-CM

## 2021-06-03 DIAGNOSIS — Z13.220 SCREENING CHOLESTEROL LEVEL: ICD-10-CM

## 2021-06-03 DIAGNOSIS — I10 ESSENTIAL HYPERTENSION: ICD-10-CM

## 2021-06-03 PROCEDURE — 99396 PREV VISIT EST AGE 40-64: CPT | Performed by: NURSE PRACTITIONER

## 2021-06-03 RX ORDER — IBUPROFEN 400 MG/1
400 TABLET ORAL EVERY 6 HOURS PRN
Qty: 40 TABLET | Refills: 0 | Status: SHIPPED | OUTPATIENT
Start: 2021-06-03 | End: 2021-06-10

## 2021-06-03 RX ORDER — AMLODIPINE BESYLATE 5 MG/1
5 TABLET ORAL DAILY
Qty: 90 TABLET | Refills: 3 | Status: SHIPPED | OUTPATIENT
Start: 2021-06-03 | End: 2022-06-27

## 2021-06-03 RX ORDER — LISINOPRIL 10 MG/1
10 TABLET ORAL 2 TIMES DAILY
Qty: 180 TABLET | Refills: 3 | Status: SHIPPED | OUTPATIENT
Start: 2021-06-03 | End: 2021-09-02

## 2021-06-03 SDOH — ECONOMIC STABILITY: FOOD INSECURITY: WITHIN THE PAST 12 MONTHS, THE FOOD YOU BOUGHT JUST DIDN'T LAST AND YOU DIDN'T HAVE MONEY TO GET MORE.: NEVER TRUE

## 2021-06-03 SDOH — ECONOMIC STABILITY: FOOD INSECURITY: WITHIN THE PAST 12 MONTHS, YOU WORRIED THAT YOUR FOOD WOULD RUN OUT BEFORE YOU GOT MONEY TO BUY MORE.: NEVER TRUE

## 2021-06-03 ASSESSMENT — PATIENT HEALTH QUESTIONNAIRE - PHQ9
SUM OF ALL RESPONSES TO PHQ QUESTIONS 1-9: 0
2. FEELING DOWN, DEPRESSED OR HOPELESS: 0
SUM OF ALL RESPONSES TO PHQ9 QUESTIONS 1 & 2: 0
SUM OF ALL RESPONSES TO PHQ QUESTIONS 1-9: 0
1. LITTLE INTEREST OR PLEASURE IN DOING THINGS: 0
SUM OF ALL RESPONSES TO PHQ QUESTIONS 1-9: 0

## 2021-06-03 ASSESSMENT — SOCIAL DETERMINANTS OF HEALTH (SDOH): HOW HARD IS IT FOR YOU TO PAY FOR THE VERY BASICS LIKE FOOD, HOUSING, MEDICAL CARE, AND HEATING?: NOT HARD AT ALL

## 2021-06-03 NOTE — PATIENT INSTRUCTIONS
questions about a medical condition or this instruction, always ask your healthcare professional. [address] any warranty or liability for your use of this information. Patient Education        High Blood Pressure: Care Instructions  Overview     It's normal for blood pressure to go up and down throughout the day. But if it stays up, you have high blood pressure. Another name for high blood pressure is hypertension. Despite what a lot of people think, high blood pressure usually doesn't cause headaches or make you feel dizzy or lightheaded. It usually has no symptoms. But it does increase your risk of stroke, heart attack, and other problems. You and your doctor will talk about your risks of these problems based on your blood pressure. Your doctor will give you a goal for your blood pressure. Your goal will be based on your health and your age. Lifestyle changes, such as eating healthy and being active, are always important to help lower blood pressure. You might also take medicine to reach your blood pressure goal.  Follow-up care is a key part of your treatment and safety. Be sure to make and go to all appointments, and call your doctor if you are having problems. It's also a good idea to know your test results and keep a list of the medicines you take. How can you care for yourself at home? Medical treatment  · If you stop taking your medicine, your blood pressure will go back up. You may take one or more types of medicine to lower your blood pressure. Be safe with medicines. Take your medicine exactly as prescribed. Call your doctor if you think you are having a problem with your medicine. · Talk to your doctor before you start taking aspirin every day. Aspirin can help certain people lower their risk of a heart attack or stroke. But taking aspirin isn't right for everyone, because it can cause serious bleeding. · See your doctor regularly.  You may need to see the doctor more often at first or until your blood pressure comes down. · If you are taking blood pressure medicine, talk to your doctor before you take decongestants or anti-inflammatory medicine, such as ibuprofen. Some of these medicines can raise blood pressure. · Learn how to check your blood pressure at home. Lifestyle changes  · Stay at a healthy weight. This is especially important if you put on weight around the waist. Losing even 10 pounds can help you lower your blood pressure. · If your doctor recommends it, get more exercise. Walking is a good choice. Bit by bit, increase the amount you walk every day. Try for at least 30 minutes on most days of the week. You also may want to swim, bike, or do other activities. · Avoid or limit alcohol. Talk to your doctor about whether you can drink any alcohol. · Try to limit how much sodium you eat to less than 2,300 milligrams (mg) a day. Your doctor may ask you to try to eat less than 1,500 mg a day. · Eat plenty of fruits (such as bananas and oranges), vegetables, legumes, whole grains, and low-fat dairy products. · Lower the amount of saturated fat in your diet. Saturated fat is found in animal products such as milk, cheese, and meat. Limiting these foods may help you lose weight and also lower your risk for heart disease. · Do not smoke. Smoking increases your risk for heart attack and stroke. If you need help quitting, talk to your doctor about stop-smoking programs and medicines. These can increase your chances of quitting for good. When should you call for help? Call  911 anytime you think you may need emergency care. This may mean having symptoms that suggest that your blood pressure is causing a serious heart or blood vessel problem. Your blood pressure may be over 180/120. For example, call 911 if:    · You have symptoms of a heart attack. These may include:  ? Chest pain or pressure, or a strange feeling in the chest.  ? Sweating. ?  Shortness of breath. ? Nausea or vomiting. ? Pain, pressure, or a strange feeling in the back, neck, jaw, or upper belly or in one or both shoulders or arms. ? Lightheadedness or sudden weakness. ? A fast or irregular heartbeat.     · You have symptoms of a stroke. These may include:  ? Sudden numbness, tingling, weakness, or loss of movement in your face, arm, or leg, especially on only one side of your body. ? Sudden vision changes. ? Sudden trouble speaking. ? Sudden confusion or trouble understanding simple statements. ? Sudden problems with walking or balance. ? A sudden, severe headache that is different from past headaches.     · You have severe back or belly pain. Do not wait until your blood pressure comes down on its own. Get help right away. Call your doctor now or seek immediate care if:    · Your blood pressure is much higher than normal (such as 180/120 or higher), but you don't have symptoms.     · You think high blood pressure is causing symptoms, such as:  ? Severe headache.  ? Blurry vision. Watch closely for changes in your health, and be sure to contact your doctor if:    · Your blood pressure measures higher than your doctor recommends at least 2 times. That means the top number is higher or the bottom number is higher, or both.     · You think you may be having side effects from your blood pressure medicine. Where can you learn more? Go to https://RECEPTA biopharmapemaryanneMobil Oto Servis.[employer]. org and sign in to your [employer] account. Enter D206 in the [facility] box to learn more about \"High Blood Pressure: Care Instructions. \"     If you do not have an account, please click on the \"Sign Up Now\" link. Current as of: August 31, 2020               Content Version: 12.8  © 0348-8208 [employer]. Care instructions adapted under license by [facility] [employer] [facility] ([facility]).  If you have questions about a medical condition or this instruction, always ask your healthcare professional. [employer] disclaims any warranty or liability for your use of this information. Patient Education        COVID-19 Vaccine: Care Instructions  Overview     The COVID-19 vaccine can help you avoid getting COVID-19, a disease caused by a new type of coronavirus. COVID-19 can cause pneumonia and even death. You may need two doses of the vaccine. And you might need \"booster\" doses later on to help you stay protected. The vaccine prevents most cases of COVID-19. But if you do still catch COVID-19, your symptoms will probably be less severe than if you hadn't gotten the vaccine. You can't get COVID-19 from the vaccine. The risk of serious problems from the vaccine is very low. And you might not have any side effects from the vaccine at all. If you do, they will probably be a lot like the common side effects of other vaccines. They include things like a slight fever, muscle aches, and soreness. These side effects don't last too long, and they can be treated if they bother you. Follow-up care is a key part of your treatment and safety. Be sure to make and go to all appointments, and call your doctor if you are having problems. It's also a good idea to know your test results and keep a list of the medicines you take. How can you care for yourself at home? · If you have a sore arm or a slight fever after the vaccine, take an over-the-counter pain medicine, such as acetaminophen or ibuprofen. Read and follow all instructions on the label. Do not give aspirin to anyone younger than 20. It has been linked to Reye syndrome, a serious illness. · Put ice or a cold pack on the sore area for 10 to 20 minutes at a time. Put a thin cloth between the ice and your skin. · Continue to practice social distancing, wear a mask, and follow all the steps for good hand-washing. When should you call for help? Call 911 anytime you think you may need emergency care.  For example, call if after getting the COVID-19 vaccine:    · You have symptoms of a severe reaction to the vaccine. Symptoms of a severe reaction may include:  ? Severe difficulty breathing. ? Sudden raised, red areas (hives) all over your body. ? Severe lightheadedness. Watch closely for changes in your health, and be sure to contact your doctor if you have any problems. Where can you learn more? Go to https://BestVendorpepiceweb.[employer]. org and sign in to your [employer] account. Enter C126 in the [employer] box to learn more about \"COVID-19 Vaccine: Care Instructions. \"     If you do not have an account, please click on the \"Sign Up Now\" link. Current as of: February 19, 2021               Content Version: 12.8  © 2006-2021 [employer]. Care instructions adapted under license by [facility] ([facility]). If you have questions about a medical condition or this instruction, always ask your healthcare professional. [place] [address] any warranty or liability for your use of this information. Seen today for wellness visit. Discussed the importance of a healthy life style. Balanced diet, nutrition, physical activity,and injury prevention. Also discussed the importance of up to date immunizations and annual screenings.

## 2021-06-07 ENCOUNTER — HOSPITAL ENCOUNTER (OUTPATIENT)
Age: 51
Discharge: HOME | End: 2021-06-07
Payer: COMMERCIAL

## 2021-06-07 DIAGNOSIS — Z12.5 SCREENING FOR PROSTATE CANCER: ICD-10-CM

## 2021-06-07 DIAGNOSIS — Z13.220 SCREENING CHOLESTEROL LEVEL: ICD-10-CM

## 2021-06-07 DIAGNOSIS — I10 ESSENTIAL HYPERTENSION: ICD-10-CM

## 2021-06-07 LAB
ALBUMIN SERPL-MCNC: 4 G/DL (ref 3.5–5.1)
ALP BLD-CCNC: 64 U/L (ref 38–126)
ALT SERPL-CCNC: 16 U/L (ref 11–66)
ANION GAP SERPL CALCULATED.3IONS-SCNC: 8 MEQ/L (ref 8–16)
AST SERPL-CCNC: 17 U/L (ref 5–40)
BILIRUB SERPL-MCNC: 0.4 MG/DL (ref 0.3–1.2)
BUN BLDV-MCNC: 12 MG/DL (ref 7–22)
CALCIUM SERPL-MCNC: 9 MG/DL (ref 8.5–10.5)
CHLORIDE BLD-SCNC: 104 MEQ/L (ref 98–111)
CHOLESTEROL, TOTAL: 170 MG/DL (ref 100–199)
CO2: 29 MEQ/L (ref 23–33)
CREAT SERPL-MCNC: 0.7 MG/DL (ref 0.4–1.2)
GFR SERPL CREATININE-BSD FRML MDRD: > 90 ML/MIN/1.73M2
GLUCOSE BLD-MCNC: 102 MG/DL (ref 70–108)
HDLC SERPL-MCNC: 33 MG/DL
LDL CHOLESTEROL CALCULATED: 108 MG/DL
POTASSIUM SERPL-SCNC: 4.6 MEQ/L (ref 3.5–5.2)
PROSTATE SPECIFIC ANTIGEN: 0.56 NG/ML (ref 0–1)
SODIUM BLD-SCNC: 141 MEQ/L (ref 135–145)
TOTAL PROTEIN: 7.4 G/DL (ref 6.1–8)
TRIGL SERPL-MCNC: 146 MG/DL (ref 0–199)

## 2021-06-07 PROCEDURE — 80061 LIPID PANEL: CPT

## 2021-06-07 PROCEDURE — G0103 PSA SCREENING: HCPCS

## 2021-06-07 PROCEDURE — 80053 COMPREHEN METABOLIC PANEL: CPT

## 2021-06-07 PROCEDURE — 36415 COLL VENOUS BLD VENIPUNCTURE: CPT

## 2021-06-07 ASSESSMENT — ENCOUNTER SYMPTOMS
WHEEZING: 0
CHEST TIGHTNESS: 0
SHORTNESS OF BREATH: 0
EYES NEGATIVE: 1
ABDOMINAL DISTENTION: 0
RESPIRATORY NEGATIVE: 1
BACK PAIN: 0
ABDOMINAL PAIN: 0
COUGH: 0

## 2021-06-07 NOTE — PROGRESS NOTES
[address] [place] [address] [place] [id number]  Dept: [phone]  Dept Fax: [phone]  Loc: [phone]  PROGRESS NOTE      VisitDate: 6/3/2021    Clau Fry is a 44 y.o. male who presents today for:     Chief Complaint   Patient presents with    Annual Exam     HTN, due for labs    Knee Pain     left side, painful, no known injury, [id number]    Back Pain     mid to upper back pain         Subjective:  Patient presents for annual exam.  Due for labs. History of hypertension. Patient also complains of some lateral left knee pain past 2 weeks. Denies any specific injury. Reports the pain only occurs when attempting to cross legs. Denies any headaches, dizziness, fever, syncope, chest pain shortness of breath abdominal pain or edema. Review of Systems   Constitutional: Negative for activity change, appetite change, chills, fatigue and fever. HENT: Negative. Eyes: Negative. Negative for visual disturbance. Respiratory: Negative. Negative for cough, chest tightness, shortness of breath and wheezing. Cardiovascular: Negative for chest pain, palpitations and leg swelling. Gastrointestinal: Negative for abdominal distention and abdominal pain. Genitourinary: Negative. Negative for dysuria. Musculoskeletal: Positive for arthralgias. Negative for back pain and neck pain. Skin: Negative. Negative for rash. Neurological: Negative for dizziness, light-headedness and headaches. Hematological: Negative for adenopathy. Psychiatric/Behavioral: Negative for decreased concentration and dysphoric mood. All other systems reviewed and are negative. History reviewed. No pertinent past medical history. History reviewed. No pertinent surgical history.   Family History   Problem Relation Age of Onset    Diabetes Other         lot of family members    Diabetes Mother     High Blood Pressure Mother      Social History Tobacco Use    Smoking status: Never Smoker    Smokeless tobacco: Never Used   Substance Use Topics    Alcohol use: No      Current Outpatient Medications   Medication Sig Dispense Refill    amLODIPine (NORVASC) 5 MG tablet Take 1 tablet by mouth daily 90 tablet 3    lisinopril (PRINIVIL;ZESTRIL) 10 MG tablet Take 1 tablet by mouth 2 times daily 180 tablet 3    ibuprofen (IBU) 400 MG tablet Take 1 tablet by mouth every 6 hours as needed for Pain 40 tablet 0    diclofenac sodium (VOLTAREN) 1 % GEL Apply 4 g topically 2 times daily Apply to knee 150 g 3     No current facility-administered medications for this visit. No Known Allergies  Health Maintenance   Topic Date Due    Hepatitis C screen  Never done    COVID-19 Vaccine (1) Never done    HIV screen  Never done    Diabetes screen  Never done    Shingles Vaccine (1 of 2) Never done    Flu vaccine (Season Ended) 09/01/2021    Potassium monitoring  06/07/2022    Creatinine monitoring  06/07/2022    Lipid screen  06/07/2026    DTaP/Tdap/Td vaccine (2 - Td or Tdap) 08/01/2027    Colon cancer screen colonoscopy  02/27/2030    Hepatitis A vaccine  Aged Out    Hepatitis B vaccine  Aged Out    Hib vaccine  Aged Out    Meningococcal (ACWY) vaccine  Aged Out    Pneumococcal 0-64 years Vaccine  Aged Out         Objective:     Physical Exam  Vitals and nursing note reviewed. Constitutional:       Appearance: Normal appearance. He is well-developed. He is not diaphoretic. HENT:      Head: Normocephalic and atraumatic. Not macrocephalic and not microcephalic. Right Ear: Hearing, tympanic membrane, ear canal and external ear normal. No drainage or tenderness. No middle ear effusion. No hemotympanum. Tympanic membrane is not injected, scarred, perforated or bulging. Left Ear: Hearing, tympanic membrane, ear canal and external ear normal. No drainage or tenderness. No middle ear effusion. No hemotympanum.  Tympanic membrane is not injected, scarred, perforated or bulging. Nose: No nasal deformity, septal deviation, mucosal edema or rhinorrhea. Right Sinus: No maxillary sinus tenderness or frontal sinus tenderness. Left Sinus: No maxillary sinus tenderness or frontal sinus tenderness. Mouth/Throat:      Mouth: No oral lesions. Dentition: Normal dentition. Does not have dentures. No dental caries or dental abscesses. Pharynx: No oropharyngeal exudate or posterior oropharyngeal erythema. Tonsils: No tonsillar abscesses. Eyes:      General: Lids are normal. No scleral icterus. Extraocular Movements:      Right eye: Normal extraocular motion. Left eye: Normal extraocular motion. Conjunctiva/sclera: Conjunctivae normal.      Right eye: Right conjunctiva is not injected. Left eye: Left conjunctiva is not injected. Neck:      Thyroid: No thyroid mass or thyromegaly. Vascular: No carotid bruit or JVD. Trachea: Trachea normal.   Cardiovascular:      Rate and Rhythm: Normal rate and regular rhythm. Heart sounds: Normal heart sounds, S1 normal and S2 normal. No murmur heard. No friction rub. No gallop. Pulmonary:      Effort: Pulmonary effort is normal. No respiratory distress. Breath sounds: Normal breath sounds. No wheezing, rhonchi or rales. Chest:      Chest wall: No tenderness. Abdominal:      General: Bowel sounds are normal.      Palpations: Abdomen is soft. There is no hepatomegaly, splenomegaly or mass. Tenderness: There is no guarding or rebound. Hernia: No hernia is present. There is no hernia in the ventral area or left inguinal area. Musculoskeletal:         General: No tenderness. Normal range of motion. Cervical back: Normal range of motion and neck supple. No edema or erythema. Normal range of motion. Left knee: No swelling, effusion, erythema, ecchymosis or bony tenderness. Normal range of motion. No tenderness.  No LCL laxity, MCL laxity or ACL laxity. Lymphadenopathy:      Head:      Right side of head: No submental, submandibular, tonsillar, preauricular, posterior auricular or occipital adenopathy. Left side of head: No submental, submandibular, tonsillar, preauricular, posterior auricular or occipital adenopathy. Cervical: No cervical adenopathy. Right cervical: No superficial, deep or posterior cervical adenopathy. Left cervical: No superficial, deep or posterior cervical adenopathy. Upper Body:      Right upper body: No supraclavicular or pectoral adenopathy. Left upper body: No supraclavicular or pectoral adenopathy. Skin:     General: Skin is warm and dry. Coloration: Skin is not pale. Findings: No bruising, ecchymosis, laceration, lesion or rash. Nails: There is no clubbing. Neurological:      General: No focal deficit present. Mental Status: He is alert and oriented to person, place, and time. Cranial Nerves: No cranial nerve deficit. Motor: No abnormal muscle tone. Coordination: Coordination normal.      Deep Tendon Reflexes: Reflexes normal.   Psychiatric:         Mood and Affect: Mood normal.         Speech: Speech normal.         Behavior: Behavior normal.         Thought Content: Thought content normal.         Judgment: Judgment normal.       /78 (Site: Right Upper Arm)   Pulse 68   Temp 98.9 °F (37.2 °C) (Oral)   Resp 18   Ht 5' 7\" (1.702 m)   Wt 259 lb 6.4 oz (117.7 kg)   SpO2 98%   BMI 40.63 kg/m²       Impression/Plan:  1. Acute pain of left knee    2. Essential hypertension    3. Screening for prostate cancer    4.  Screening cholesterol level      Requested Prescriptions     Signed Prescriptions Disp Refills    amLODIPine (NORVASC) 5 MG tablet 90 tablet 3     Sig: Take 1 tablet by mouth daily    lisinopril (PRINIVIL;ZESTRIL) 10 MG tablet 180 tablet 3     Sig: Take 1 tablet by mouth 2 times daily    ibuprofen (IBU) 400 MG tablet 40 tablet 0     Sig: Take 1 tablet by mouth every 6 hours as needed for Pain    diclofenac sodium (VOLTAREN) 1 %  g 3     Sig: Apply 4 g topically 2 times daily Apply to knee     Orders Placed This Encounter   Procedures    Comprehensive Metabolic Panel     Standing Status:   Future     Number of Occurrences:   1     Standing Expiration Date:   6/3/2022    Lipid Panel     Standing Status:   Future     Number of Occurrences:   1     Standing Expiration Date:   6/3/2022     Order Specific Question:   Is Patient Fasting?/# of Hours     Answer:   yes/12    PSA screening     Standing Status:   Future     Number of Occurrences:   1     Standing Expiration Date:   6/3/2022       Patient giveneducational materials - see patient instructions. Discussed use, benefit, and side effects of prescribed medications. All patient questions answered. Pt voiced understanding. Reviewed health maintenance. Patient agreedwith treatment plan. Follow up as directed. Routine labs ordered. Vascular lisinopril refilled. Hypertension information provided. Knee exercise information provided. Voltaren gel prescribed for knee pain. Continue medications as prescribed. Educational information on above diagnosis  provided per AVS.  Seen today for wellness visit. Discussed the importance of a healthy life style. Balanced diet, nutrition, physical activity,and injury prevention. Also discussed the importance of up to date immunizations and annual screenings.       Electronically signed by JUAN Cruz CNP on 6/7/2021 at 10:39 AM

## 2021-06-10 RX ORDER — IBUPROFEN 400 MG/1
400 TABLET ORAL EVERY 6 HOURS PRN
Qty: 40 TABLET | Refills: 0 | Status: SHIPPED | OUTPATIENT
Start: 2021-06-10 | End: 2021-06-17

## 2021-06-17 RX ORDER — IBUPROFEN 400 MG/1
400 TABLET ORAL EVERY 6 HOURS PRN
Qty: 40 TABLET | Refills: 0 | Status: SHIPPED | OUTPATIENT
Start: 2021-06-17 | End: 2022-01-25

## 2021-09-02 DIAGNOSIS — I10 ESSENTIAL HYPERTENSION: ICD-10-CM

## 2021-09-03 RX ORDER — LISINOPRIL 10 MG/1
TABLET ORAL
Qty: 180 TABLET | Refills: 2 | Status: SHIPPED | OUTPATIENT
Start: 2021-09-03 | End: 2022-06-27

## 2022-01-25 ENCOUNTER — OFFICE VISIT (OUTPATIENT)
Dept: FAMILY MEDICINE CLINIC | Age: 52
End: 2022-01-25
Payer: COMMERCIAL

## 2022-01-25 VITALS
WEIGHT: 260 LBS | BODY MASS INDEX: 40.72 KG/M2 | RESPIRATION RATE: 20 BRPM | TEMPERATURE: 99.1 F | DIASTOLIC BLOOD PRESSURE: 78 MMHG | HEART RATE: 81 BPM | OXYGEN SATURATION: 97 % | SYSTOLIC BLOOD PRESSURE: 126 MMHG

## 2022-01-25 DIAGNOSIS — H10.31 ACUTE BACTERIAL CONJUNCTIVITIS OF RIGHT EYE: ICD-10-CM

## 2022-01-25 DIAGNOSIS — H00.011 HORDEOLUM EXTERNUM OF RIGHT UPPER EYELID: Primary | ICD-10-CM

## 2022-01-25 PROCEDURE — 99213 OFFICE O/P EST LOW 20 MIN: CPT | Performed by: FAMILY MEDICINE

## 2022-01-25 RX ORDER — GENTAMICIN SULFATE 3 MG/ML
1 SOLUTION/ DROPS OPHTHALMIC 4 TIMES DAILY
Qty: 5 ML | Refills: 0 | Status: SHIPPED | OUTPATIENT
Start: 2022-01-25 | End: 2022-02-04

## 2022-01-30 ASSESSMENT — ENCOUNTER SYMPTOMS
COUGH: 0
EYE PAIN: 1
DIARRHEA: 0
RHINORRHEA: 0
CONSTIPATION: 0
ABDOMINAL PAIN: 0
EYE DISCHARGE: 1
NAUSEA: 0
SINUS PRESSURE: 0
EYE REDNESS: 1
SORE THROAT: 0
ABDOMINAL DISTENTION: 0
SHORTNESS OF BREATH: 0

## 2022-01-30 NOTE — PROGRESS NOTES
[address] [place] [address]  Dept: [phone]  Dept Fax: [phone]  Loc: [phone]  PROGRESS NOTE      VisitDate: 1/25/2022    My Muller is a 46 y.o. male who presents today for:     Chief Complaint   Patient presents with    Eye Pain     c/o right eye pain, swelling since Sunday. Subjective:  HPI  Patient comes in right-sided eye pain redness swelling and discharge for the past couple of days. No fever chills no other respiratory symptoms    Review of Systems   Constitutional: Negative for appetite change and fever. HENT: Negative for congestion, ear pain, postnasal drip, rhinorrhea, sinus pressure and sore throat. Eyes: Positive for pain, discharge and redness. Negative for visual disturbance. Respiratory: Negative for cough and shortness of breath. Cardiovascular: Negative for chest pain. Gastrointestinal: Negative for abdominal distention, abdominal pain, constipation, diarrhea and nausea. Genitourinary: Negative for dysuria, frequency and urgency. Musculoskeletal: Negative for arthralgias. Skin: Negative for rash. Neurological: Negative for dizziness. History reviewed. No pertinent past medical history. History reviewed. No pertinent surgical history.   Family History   Problem Relation Age of Onset    Diabetes Other         lot of family members    Diabetes Mother     High Blood Pressure Mother      Social History     Tobacco Use    Smoking status: Never Smoker    Smokeless tobacco: Never Used   Substance Use Topics    Alcohol use: No      Current Outpatient Medications   Medication Sig Dispense Refill    gentamicin (GARAMYCIN) 0.3 % ophthalmic solution Place 1 drop into the right eye 4 times daily for 10 days 5 mL 0    lisinopril (PRINIVIL;ZESTRIL) 10 MG tablet TAKE 1 TABLET BY MOUTH TWICE DAILY 180 tablet 2    amLODIPine (NORVASC) 5 MG tablet Take 1 tablet by mouth daily 90 tablet 3    diclofenac sodium (VOLTAREN) 1 % GEL Apply 4 g topically 2 times daily Apply to knee 150 g 3     No current facility-administered medications for this visit. No Known Allergies  Health Maintenance   Topic Date Due    Hepatitis C screen  Never done    HIV screen  Never done    Diabetes screen  Never done    Shingles Vaccine (1 of 2) Never done    Flu vaccine (1) Never done    COVID-19 Vaccine (3 - Booster for Moderna series) 12/03/2021    Depression Screen  06/03/2022    Potassium monitoring  06/07/2022    Creatinine monitoring  06/07/2022    Lipid screen  06/07/2026    DTaP/Tdap/Td vaccine (2 - Td or Tdap) 08/01/2027    Colon cancer screen colonoscopy  02/27/2030    Hepatitis A vaccine  Aged Out    Hepatitis B vaccine  Aged Out    Hib vaccine  Aged Out    Meningococcal (ACWY) vaccine  Aged Out    Pneumococcal 0-64 years Vaccine  Aged Out         Objective:     Physical Exam  Constitutional:       General: He is not in acute distress. Appearance: He is well-developed. He is not diaphoretic. HENT:      Head: Normocephalic and atraumatic. Right Ear: External ear normal.      Left Ear: External ear normal.   Eyes:      General:         Right eye: Discharge present. Comments: Right sclera conjunctiva injected on the right upper lid there is a palpable stye   Neck:      Vascular: No JVD. Cardiovascular:      Rate and Rhythm: Normal rate and regular rhythm. Heart sounds: Normal heart sounds. Pulmonary:      Effort: Pulmonary effort is normal.      Breath sounds: Normal breath sounds. No wheezing or rales. Musculoskeletal:         General: No tenderness. Skin:     General: Skin is warm and dry. Coloration: Skin is not pale. Neurological:      Mental Status: He is alert and oriented to person, place, and time.        /78   Pulse 81   Temp 99.1 °F (37.3 °C) (Oral)   Resp 20   Wt 260 lb (117.9 kg)   SpO2 97%   BMI 40.72 kg/m² Impression/Plan:  1. Hordeolum externum of right upper eyelid    2. Acute bacterial conjunctivitis of right eye      Requested Prescriptions     Signed Prescriptions Disp Refills    gentamicin (GARAMYCIN) 0.3 % ophthalmic solution 5 mL 0     Sig: Place 1 drop into the right eye 4 times daily for 10 days     No orders of the defined types were placed in this encounter. Patient giveneducational materials - see patient instructions. Discussed use, benefit, and side effects of prescribed medications. All patient questions answered. Pt voiced understanding. Reviewed health maintenance. Patient agreedwith treatment plan. Follow up as directed. **This report has been created using voice recognition software. It may contain minor errorswhich are inherent in voice recognition technology. **       Electronically signed by Antionette Paul MD on 1/30/2022 at 10:26 AM

## 2022-05-05 ENCOUNTER — HOSPITAL ENCOUNTER (EMERGENCY)
Age: 52
Discharge: HOME | End: 2022-05-05
Payer: COMMERCIAL

## 2022-05-05 VITALS
RESPIRATION RATE: 16 BRPM | DIASTOLIC BLOOD PRESSURE: 99 MMHG | TEMPERATURE: 97.9 F | OXYGEN SATURATION: 98 % | BODY MASS INDEX: 40.81 KG/M2 | WEIGHT: 260 LBS | HEART RATE: 76 BPM | SYSTOLIC BLOOD PRESSURE: 147 MMHG | HEIGHT: 67 IN

## 2022-05-05 DIAGNOSIS — H66.93 ACUTE BILATERAL OTITIS MEDIA: Primary | ICD-10-CM

## 2022-05-05 DIAGNOSIS — H60.92 OTITIS EXTERNA OF LEFT EAR, UNSPECIFIED CHRONICITY, UNSPECIFIED TYPE: ICD-10-CM

## 2022-05-05 PROCEDURE — 99213 OFFICE O/P EST LOW 20 MIN: CPT | Performed by: EMERGENCY MEDICINE

## 2022-05-05 PROCEDURE — 99213 OFFICE O/P EST LOW 20 MIN: CPT

## 2022-05-05 RX ORDER — CIPROFLOXACIN HYDROCHLORIDE 3.5 MG/ML
SOLUTION/ DROPS TOPICAL
Qty: 1 EACH | Refills: 0 | Status: SHIPPED | OUTPATIENT
Start: 2022-05-05

## 2022-05-05 RX ORDER — AMOXICILLIN 875 MG/1
875 TABLET, COATED ORAL 2 TIMES DAILY
Qty: 20 TABLET | Refills: 0 | Status: SHIPPED | OUTPATIENT
Start: 2022-05-05 | End: 2022-05-15

## 2022-05-05 ASSESSMENT — PAIN DESCRIPTION - LOCATION: LOCATION: EAR

## 2022-05-05 ASSESSMENT — PAIN DESCRIPTION - ORIENTATION: ORIENTATION: RIGHT;LEFT

## 2022-05-05 ASSESSMENT — PAIN SCALES - GENERAL: PAINLEVEL_OUTOF10: 8

## 2022-05-05 ASSESSMENT — PAIN - FUNCTIONAL ASSESSMENT: PAIN_FUNCTIONAL_ASSESSMENT: 0-10

## 2022-05-05 ASSESSMENT — ENCOUNTER SYMPTOMS
TROUBLE SWALLOWING: 0
SORE THROAT: 0
SHORTNESS OF BREATH: 0
COUGH: 0

## 2022-05-05 NOTE — ED PROVIDER NOTES
[facility]       CHIEF COMPLAINT       Chief Complaint   Patient presents with    Ear Problem       Nurses Notes reviewed and I agree except as noted in the HPI. HISTORY OF PRESENT ILLNESS   Dianna Dejesus is a 46 y.o. male who presents for complaints of bilateral ear pain. Pain has been present for 3 to 4 days. Pain is now radiating down into bilateral jaws. Denies sinus pain or pressure or congestion. No drainage from the ears. No known fever. States his hearing is dulled. No cough, chest pain or shortness of breath. Denies swimming or water exposure other than showers. No trauma to the areas. HPI    REVIEW OF SYSTEMS     Review of Systems   Constitutional: Negative for chills, diaphoresis and fatigue. HENT: Positive for ear discharge, ear pain and hearing loss. Negative for sore throat and trouble swallowing. Respiratory: Negative for cough and shortness of breath. Neurological: Negative for dizziness and headaches. PAST MEDICAL HISTORY   History reviewed. No pertinent past medical history. SURGICALHISTORY     Patient  has no past surgical history on file. CURRENT MEDICATIONS       Discharge Medication List as of 5/5/2022  5:23 PM      CONTINUE these medications which have NOT CHANGED    Details   lisinopril (PRINIVIL;ZESTRIL) 10 MG tablet TAKE 1 TABLET BY MOUTH TWICE DAILY, Disp-180 tablet, R-2Normal      amLODIPine (NORVASC) 5 MG tablet Take 1 tablet by mouth daily, Disp-90 tablet, R-3Normal      diclofenac sodium (VOLTAREN) 1 % GEL Apply 4 g topically 2 times daily Apply to knee, Topical, 2 TIMES DAILY Starting Thu 6/3/2021, Disp-150 g, R-3, Normal             ALLERGIES     Patient is has No Known Allergies.     Patients   Immunization History   Administered Date(s) Administered    COVID-19, Moderna, Primary or Immunocompromised, PF, 100mcg/0.5mL 06/03/2021, 07/03/2021    Tdap (Boostrix, Adacel) 08/01/2017       FAMILY HISTORY     Patient's family history includes Diabetes in his mother and another family member; High Blood Pressure in his mother. SOCIAL HISTORY     Patient  reports that he has never smoked. He has never used smokeless tobacco. He reports that he does not drink alcohol and does not use drugs. PHYSICAL EXAM     ED TRIAGE VITALS  BP: (!) 147/99, Temp: 97.9 °F (36.6 °C), Pulse: 76, Resp: 16, SpO2: 98 %,Estimated body mass index is 40.72 kg/m² as calculated from the following:    Height as of this encounter: 5' 7\" (1.702 m). Weight as of this encounter: 260 lb (117.9 kg). ,No LMP for male patient. Physical Exam  Constitutional:       Appearance: He is not ill-appearing. HENT:      Head: Normocephalic. Right Ear: Tenderness present. A middle ear effusion is present. No mastoid tenderness. Tympanic membrane is erythematous. Left Ear: Tenderness present. A middle ear effusion is present. No mastoid tenderness. Tympanic membrane is erythematous. Nose: Nose normal. No congestion or rhinorrhea. Mouth/Throat:      Mouth: Mucous membranes are moist.   Cardiovascular:      Rate and Rhythm: Normal rate and regular rhythm. Pulses: Normal pulses. Pulmonary:      Effort: Pulmonary effort is normal.      Breath sounds: Normal breath sounds. Skin:     General: Skin is warm and dry. Neurological:      General: No focal deficit present. Mental Status: He is alert. Psychiatric:         Mood and Affect: Mood normal.         Behavior: Behavior normal.         DIAGNOSTIC RESULTS     Labs:No results found for this visit on 05/05/22. IMAGING:    No orders to display         EKG:      URGENT CARE COURSE:     Vitals:    05/05/22 1640   BP: (!) 147/99   Pulse: 76   Resp: 16   Temp: 97.9 °F (36.6 °C)   TempSrc: Infrared   SpO2: 98%   Weight: 260 lb (117.9 kg)   Height: 5' 7\" (1.702 m)       Medications - No data to display         PROCEDURES:  None    FINAL IMPRESSION      1.  Acute bilateral otitis media    2. Otitis externa of left ear, unspecified chronicity, unspecified type          DISPOSITION/ PLAN   Patient presents for what appears to be bilateral otitis media. Patient also has erythema and mild ulcerations in the left auditory canal.  Will place patient on amoxicillin and Cipro ophthalmic drops to place in the left ear canal for treatment of symptoms. Advised to take Tylenol or ibuprofen for pain. Drink plenty of water. Return for new or worsening symptoms.         PATIENT REFERRED TO:  JUAN Pelayo CNP  [address] [facility] [place] [address]      DISCHARGE MEDICATIONS:  Discharge Medication List as of 5/5/2022  5:23 PM      START taking these medications    Details   amoxicillin (AMOXIL) 875 MG tablet Take 1 tablet by mouth 2 times daily for 10 days, Disp-20 tablet, R-0Normal      ciprofloxacin (CILOXAN) 0.3 % ophthalmic solution Apply 2 drops left ear twice daily for 7 days, Disp-1 each, R-0Normal             Discharge Medication List as of 5/5/2022  5:23 PM          Discharge Medication List as of 5/5/2022  5:23 PM          JUAN Metz CNP    (Please note that portions of this note were completed with a voice recognition program. Efforts were made to edit the dictations but occasionally words are mis-transcribed.)          JUAN Metz CNP  05/05/22 [id number]

## 2022-06-25 DIAGNOSIS — I10 ESSENTIAL HYPERTENSION: ICD-10-CM

## 2022-06-27 RX ORDER — AMLODIPINE BESYLATE 5 MG/1
5 TABLET ORAL DAILY
Qty: 90 TABLET | Refills: 1 | Status: SHIPPED | OUTPATIENT
Start: 2022-06-27

## 2022-06-27 RX ORDER — LISINOPRIL 10 MG/1
TABLET ORAL
Qty: 180 TABLET | Refills: 1 | Status: SHIPPED | OUTPATIENT
Start: 2022-06-27

## 2022-12-22 ENCOUNTER — OFFICE VISIT (OUTPATIENT)
Dept: FAMILY MEDICINE CLINIC | Age: 52
End: 2022-12-22

## 2022-12-22 VITALS
HEART RATE: 78 BPM | BODY MASS INDEX: 40.16 KG/M2 | RESPIRATION RATE: 16 BRPM | DIASTOLIC BLOOD PRESSURE: 86 MMHG | HEIGHT: 68 IN | WEIGHT: 265 LBS | TEMPERATURE: 98.7 F | SYSTOLIC BLOOD PRESSURE: 116 MMHG | OXYGEN SATURATION: 98 %

## 2022-12-22 DIAGNOSIS — Z13.220 SCREENING CHOLESTEROL LEVEL: ICD-10-CM

## 2022-12-22 DIAGNOSIS — Z00.00 WELL ADULT EXAM: ICD-10-CM

## 2022-12-22 DIAGNOSIS — I10 ESSENTIAL HYPERTENSION: ICD-10-CM

## 2022-12-22 DIAGNOSIS — G89.29 CHRONIC PAIN OF RIGHT KNEE: ICD-10-CM

## 2022-12-22 DIAGNOSIS — M25.561 CHRONIC PAIN OF RIGHT KNEE: ICD-10-CM

## 2022-12-22 DIAGNOSIS — Z12.5 SCREENING FOR PROSTATE CANCER: ICD-10-CM

## 2022-12-22 DIAGNOSIS — I10 ESSENTIAL HYPERTENSION: Primary | ICD-10-CM

## 2022-12-22 RX ORDER — LISINOPRIL 10 MG/1
TABLET ORAL
Qty: 180 TABLET | Refills: 1 | Status: SHIPPED | OUTPATIENT
Start: 2022-12-22 | End: 2022-12-22 | Stop reason: SDUPTHER

## 2022-12-22 RX ORDER — AMLODIPINE BESYLATE 5 MG/1
5 TABLET ORAL DAILY
Qty: 90 TABLET | Refills: 1 | Status: SHIPPED | OUTPATIENT
Start: 2022-12-22 | End: 2022-12-22 | Stop reason: SDUPTHER

## 2022-12-22 RX ORDER — AMLODIPINE BESYLATE 5 MG/1
5 TABLET ORAL DAILY
Qty: 90 TABLET | Refills: 3 | Status: SHIPPED | OUTPATIENT
Start: 2022-12-22

## 2022-12-22 RX ORDER — LISINOPRIL 10 MG/1
TABLET ORAL
Qty: 180 TABLET | Refills: 3 | Status: SHIPPED | OUTPATIENT
Start: 2022-12-22

## 2022-12-22 SDOH — ECONOMIC STABILITY: FOOD INSECURITY: WITHIN THE PAST 12 MONTHS, THE FOOD YOU BOUGHT JUST DIDN'T LAST AND YOU DIDN'T HAVE MONEY TO GET MORE.: NEVER TRUE

## 2022-12-22 SDOH — ECONOMIC STABILITY: FOOD INSECURITY: WITHIN THE PAST 12 MONTHS, YOU WORRIED THAT YOUR FOOD WOULD RUN OUT BEFORE YOU GOT MONEY TO BUY MORE.: NEVER TRUE

## 2022-12-22 ASSESSMENT — ENCOUNTER SYMPTOMS
ABDOMINAL PAIN: 0
SHORTNESS OF BREATH: 0
WHEEZING: 0
ABDOMINAL DISTENTION: 0
BACK PAIN: 0
CHEST TIGHTNESS: 0
COUGH: 0

## 2022-12-22 ASSESSMENT — PATIENT HEALTH QUESTIONNAIRE - PHQ9
1. LITTLE INTEREST OR PLEASURE IN DOING THINGS: 0
2. FEELING DOWN, DEPRESSED OR HOPELESS: 0
SUM OF ALL RESPONSES TO PHQ QUESTIONS 1-9: 0
SUM OF ALL RESPONSES TO PHQ QUESTIONS 1-9: 0
SUM OF ALL RESPONSES TO PHQ9 QUESTIONS 1 & 2: 0
SUM OF ALL RESPONSES TO PHQ QUESTIONS 1-9: 0
SUM OF ALL RESPONSES TO PHQ QUESTIONS 1-9: 0

## 2022-12-22 ASSESSMENT — SOCIAL DETERMINANTS OF HEALTH (SDOH): HOW HARD IS IT FOR YOU TO PAY FOR THE VERY BASICS LIKE FOOD, HOUSING, MEDICAL CARE, AND HEATING?: NOT HARD AT ALL

## 2022-12-22 NOTE — PROGRESS NOTES
Immunization(s) given during visit:    Immunizations Administered       Name Date Dose Route    Influenza, FLUCELVAX, (age 10 mo+), MDCK, PF, 0.5mL 12/22/2022 0.5 mL Intramuscular    Site: Deltoid- Right    Lot: [id number]    NDC: 11953-777-17            Most recent Vaccine Information Sheet dated 12-22-22 given to pt

## 2022-12-22 NOTE — PROGRESS NOTES
[address] [place] [facility] [address]  Dept: [phone]  Dept Fax: [phone]  Loc: [phone]  PROGRESS NOTE      VisitDate: 12/22/2022    Grisel Suarez is a 45 y.o. male who presents today for:     Chief Complaint   Patient presents with    Follow-up    Annual Exam         Subjective: Annual wellness exam/medication refills. History of hypertension, osteoarthritis multiple joints chronic right knee pain. Take medications as prescribed. Denies any fever illness headache syncope dizziness chest pain shortness of breath abdominal pain rash numbness or edema. Reports regular bowel movements no blood or melena denies any dysuria hematuria flank pain nocturia. Review of Systems   Constitutional:  Negative for activity change, appetite change, chills, fatigue and fever. Eyes:  Negative for visual disturbance. Respiratory:  Negative for cough, chest tightness, shortness of breath and wheezing. Cardiovascular:  Negative for chest pain, palpitations and leg swelling. Gastrointestinal:  Negative for abdominal distention and abdominal pain. Genitourinary:  Negative for dysuria. Musculoskeletal:  Negative for arthralgias, back pain and neck pain. Skin: Negative. Negative for rash. Neurological:  Negative for dizziness, light-headedness and headaches. Hematological:  Negative for adenopathy. Psychiatric/Behavioral:  Negative for decreased concentration and dysphoric mood. All other systems reviewed and are negative. History reviewed. No pertinent past medical history. History reviewed. No pertinent surgical history.   Family History   Problem Relation Age of Onset    Diabetes Other         lot of family members    Diabetes Mother     High Blood Pressure Mother      Social History     Tobacco Use    Smoking status: Never    Smokeless tobacco: Never   Substance Use Topics    Alcohol use: No      Current Outpatient Medications   Medication Sig Dispense Refill    amLODIPine (NORVASC) 5 MG tablet Take 1 tablet by mouth daily 90 tablet 3    lisinopril (PRINIVIL;ZESTRIL) 10 MG tablet TAKE 1 TABLET BY MOUTH TWICE DAILY 180 tablet 3    diclofenac sodium (VOLTAREN) 1 % GEL Apply 4 g topically 2 times daily Apply to knee 150 g 3     No current facility-administered medications for this visit. No Known Allergies  Health Maintenance   Topic Date Due    HIV screen  Never done    Hepatitis C screen  Never done    Diabetes screen  Never done    Shingles vaccine (1 of 2) Never done    COVID-19 Vaccine (3 - Booster for Moderna series) 08/28/2021    Depression Screen  06/03/2022    Lipids  06/07/2026    DTaP/Tdap/Td vaccine (2 - Td or Tdap) 08/01/2027    Colorectal Cancer Screen  02/27/2030    Flu vaccine  Completed    Hepatitis A vaccine  Aged Out    Hib vaccine  Aged Out    Meningococcal (ACWY) vaccine  Aged Out    Pneumococcal 0-64 years Vaccine  Aged Out         Objective:     Physical Exam  Vitals and nursing note reviewed. Constitutional:       Appearance: Normal appearance. He is well-developed. He is not diaphoretic. HENT:      Head: Normocephalic and atraumatic. Not macrocephalic and not microcephalic. Right Ear: Hearing, tympanic membrane, ear canal and external ear normal. No drainage or tenderness. No middle ear effusion. No hemotympanum. Tympanic membrane is not injected, scarred, perforated or bulging. Left Ear: Hearing, tympanic membrane, ear canal and external ear normal. No drainage or tenderness. No middle ear effusion. No hemotympanum. Tympanic membrane is not injected, scarred, perforated or bulging. Nose: Nose normal. No nasal deformity, septal deviation, mucosal edema or rhinorrhea. Right Sinus: No maxillary sinus tenderness or frontal sinus tenderness. Left Sinus: No maxillary sinus tenderness or frontal sinus tenderness. Mouth/Throat:      Mouth: No oral lesions. Dentition: Normal dentition. Does not have dentures. No dental caries or dental abscesses. Pharynx: Oropharynx is clear. No oropharyngeal exudate or posterior oropharyngeal erythema. Tonsils: No tonsillar abscesses. Eyes:      General: Lids are normal. No scleral icterus. Extraocular Movements:      Right eye: Normal extraocular motion. Left eye: Normal extraocular motion. Conjunctiva/sclera: Conjunctivae normal.      Right eye: Right conjunctiva is not injected. Left eye: Left conjunctiva is not injected. Pupils: Pupils are equal, round, and reactive to light. Neck:      Thyroid: No thyroid mass or thyromegaly. Vascular: No carotid bruit or JVD. Trachea: Trachea normal.   Cardiovascular:      Rate and Rhythm: Normal rate and regular rhythm. Pulses: Normal pulses. Heart sounds: Normal heart sounds, S1 normal and S2 normal. No murmur heard. No friction rub. No gallop. Pulmonary:      Effort: Pulmonary effort is normal. No respiratory distress. Breath sounds: Normal breath sounds. No wheezing, rhonchi or rales. Chest:      Chest wall: No tenderness. Abdominal:      General: Bowel sounds are normal.      Palpations: Abdomen is soft. There is no hepatomegaly, splenomegaly or mass. Tenderness: There is no guarding or rebound. Hernia: No hernia is present. There is no hernia in the ventral area or left inguinal area. Musculoskeletal:         General: No tenderness. Normal range of motion. Cervical back: Normal range of motion and neck supple. No edema or erythema. Normal range of motion. Lymphadenopathy:      Head:      Right side of head: No submental, submandibular, tonsillar, preauricular, posterior auricular or occipital adenopathy. Left side of head: No submental, submandibular, tonsillar, preauricular, posterior auricular or occipital adenopathy. Cervical: No cervical adenopathy.       Right cervical: No superficial, deep or posterior cervical adenopathy. Left cervical: No superficial, deep or posterior cervical adenopathy. Upper Body:      Right upper body: No supraclavicular or pectoral adenopathy. Left upper body: No supraclavicular or pectoral adenopathy. Skin:     General: Skin is warm and dry. Coloration: Skin is not pale. Findings: No bruising, ecchymosis, laceration, lesion or rash. Nails: There is no clubbing. Neurological:      General: No focal deficit present. Mental Status: He is alert and oriented to person, place, and time. Cranial Nerves: No cranial nerve deficit. Motor: No abnormal muscle tone. Coordination: Coordination normal.      Deep Tendon Reflexes: Reflexes normal.   Psychiatric:         Mood and Affect: Mood normal.         Speech: Speech normal.         Behavior: Behavior normal.         Thought Content: Thought content normal.         Judgment: Judgment normal.     /86   Pulse 78   Temp 98.7 °F (37.1 °C) (Oral)   Resp 16   Ht 5' 7.75\" (1.721 m)   Wt 265 lb (120.2 kg)   SpO2 98%   BMI 40.59 kg/m²       Impression/Plan:  1. Essential hypertension    2. Screening for prostate cancer    3. Screening cholesterol level    4. Chronic pain of right knee    5.  Well adult exam      Requested Prescriptions     Signed Prescriptions Disp Refills    amLODIPine (NORVASC) 5 MG tablet 90 tablet 3     Sig: Take 1 tablet by mouth daily    lisinopril (PRINIVIL;ZESTRIL) 10 MG tablet 180 tablet 3     Sig: TAKE 1 TABLET BY MOUTH TWICE DAILY    diclofenac sodium (VOLTAREN) 1 %  g 3     Sig: Apply 4 g topically 2 times daily Apply to knee     Orders Placed This Encounter   Procedures    Influenza, FLUCELVAX, (age 10 mo+), IM, Preservative Free, 0.5 mL    Comprehensive Metabolic Panel     Standing Status:   Future     Standing Expiration Date:   12/22/2023    Lipid Panel     Standing Status:   Future     Standing Expiration Date:   12/22/2023     Order Specific Question:   Is Patient Fasting?/# of Hours     Answer:   yes/12    PSA Screening     Standing Status:   Future     Standing Expiration Date:   12/22/2023     Influenza vaccination updated. CMP FLP PSA ordered. Refills provided. Seen today for wellness visit. Discussed the importance of a healthy life style. Balanced diet, nutrition, physical activity,and injury prevention. Also discussed the importance of up to date immunizations and annual screenings. Patient giveneducational materials - see patient instructions. Discussed use, benefit, and side effects of prescribed medications. All patient questions answered. Pt voiced understanding. Reviewed health maintenance. Patient agreedwith treatment plan. Follow up as directed.           Electronically signed by JUAN Etienne CNP on 12/22/2022 at 12:01 PM

## 2022-12-29 ENCOUNTER — TELEPHONE (OUTPATIENT)
Dept: FAMILY MEDICINE CLINIC | Age: 52
End: 2022-12-29

## 2023-06-27 DIAGNOSIS — I10 ESSENTIAL HYPERTENSION: ICD-10-CM

## 2023-06-27 RX ORDER — LISINOPRIL 10 MG/1
TABLET ORAL
Qty: 180 TABLET | Refills: 1 | Status: SHIPPED | OUTPATIENT
Start: 2023-06-27

## 2023-06-27 RX ORDER — AMLODIPINE BESYLATE 5 MG/1
5 TABLET ORAL DAILY
Qty: 90 TABLET | Refills: 1 | Status: SHIPPED | OUTPATIENT
Start: 2023-06-27

## 2023-07-23 ENCOUNTER — HOSPITAL ENCOUNTER (EMERGENCY)
Age: 53
Discharge: HOME | End: 2023-07-23
Payer: COMMERCIAL

## 2023-07-23 VITALS
SYSTOLIC BLOOD PRESSURE: 145 MMHG | HEART RATE: 75 BPM | DIASTOLIC BLOOD PRESSURE: 89 MMHG | RESPIRATION RATE: 16 BRPM | TEMPERATURE: 97.6 F | OXYGEN SATURATION: 100 % | BODY MASS INDEX: 38.29 KG/M2 | WEIGHT: 250 LBS

## 2023-07-23 DIAGNOSIS — L23.7 POISON IVY DERMATITIS: Primary | ICD-10-CM

## 2023-07-23 PROCEDURE — 96372 THER/PROPH/DIAG INJ SC/IM: CPT

## 2023-07-23 PROCEDURE — 99213 OFFICE O/P EST LOW 20 MIN: CPT

## 2023-07-23 PROCEDURE — 6360000002 HC RX W HCPCS: Performed by: NURSE PRACTITIONER

## 2023-07-23 PROCEDURE — 99213 OFFICE O/P EST LOW 20 MIN: CPT | Performed by: NURSE PRACTITIONER

## 2023-07-23 RX ORDER — METHYLPREDNISOLONE ACETATE 80 MG/ML
80 INJECTION, SUSPENSION INTRA-ARTICULAR; INTRALESIONAL; INTRAMUSCULAR; SOFT TISSUE ONCE
Status: COMPLETED | OUTPATIENT
Start: 2023-07-23 | End: 2023-07-23

## 2023-07-23 RX ADMIN — METHYLPREDNISOLONE ACETATE 80 MG: 80 INJECTION, SUSPENSION INTRA-ARTICULAR; INTRALESIONAL; INTRAMUSCULAR; SOFT TISSUE at 15:35

## 2023-07-23 ASSESSMENT — ENCOUNTER SYMPTOMS
CONSTIPATION: 0
BLOOD IN STOOL: 0
ABDOMINAL PAIN: 0
SINUS PRESSURE: 0
WHEEZING: 0
VOMITING: 0
COUGH: 0
SHORTNESS OF BREATH: 0
EYE PAIN: 0
DIARRHEA: 0
NAUSEA: 0
RHINORRHEA: 0

## 2023-07-23 ASSESSMENT — PAIN - FUNCTIONAL ASSESSMENT: PAIN_FUNCTIONAL_ASSESSMENT: NONE - DENIES PAIN

## 2024-02-26 RX ORDER — AMLODIPINE BESYLATE 5 MG/1
5 TABLET ORAL DAILY
Qty: 90 TABLET | Refills: 2 | Status: SHIPPED | OUTPATIENT
Start: 2024-02-26

## 2024-04-21 ENCOUNTER — HOSPITAL ENCOUNTER (EMERGENCY)
Age: 54
Discharge: HOME | End: 2024-04-21
Payer: COMMERCIAL

## 2024-04-21 VITALS
HEART RATE: 58 BPM | WEIGHT: 250 LBS | RESPIRATION RATE: 20 BRPM | OXYGEN SATURATION: 98 % | TEMPERATURE: 98.5 F | HEIGHT: 68 IN | SYSTOLIC BLOOD PRESSURE: 147 MMHG | DIASTOLIC BLOOD PRESSURE: 91 MMHG | BODY MASS INDEX: 37.89 KG/M2

## 2024-04-21 DIAGNOSIS — L25.9 CONTACT DERMATITIS, UNSPECIFIED CONTACT DERMATITIS TYPE, UNSPECIFIED TRIGGER: Primary | ICD-10-CM

## 2024-04-21 PROCEDURE — 99213 OFFICE O/P EST LOW 20 MIN: CPT | Performed by: NURSE PRACTITIONER

## 2024-04-21 PROCEDURE — 99213 OFFICE O/P EST LOW 20 MIN: CPT

## 2024-04-21 PROCEDURE — 96372 THER/PROPH/DIAG INJ SC/IM: CPT

## 2024-04-21 PROCEDURE — 6360000002 HC RX W HCPCS: Performed by: NURSE PRACTITIONER

## 2024-04-21 RX ORDER — HYDROXYZINE PAMOATE 25 MG/1
25 CAPSULE ORAL 3 TIMES DAILY PRN
Qty: 20 CAPSULE | Refills: 0 | Status: SHIPPED | OUTPATIENT
Start: 2024-04-21

## 2024-04-21 RX ORDER — METHYLPREDNISOLONE ACETATE 80 MG/ML
80 INJECTION, SUSPENSION INTRA-ARTICULAR; INTRALESIONAL; INTRAMUSCULAR; SOFT TISSUE ONCE
Status: COMPLETED | OUTPATIENT
Start: 2024-04-21 | End: 2024-04-21

## 2024-04-21 RX ADMIN — METHYLPREDNISOLONE ACETATE 80 MG: 80 INJECTION, SUSPENSION INTRA-ARTICULAR; INTRALESIONAL; INTRAMUSCULAR; SOFT TISSUE at 11:20

## 2024-04-21 ASSESSMENT — PAIN DESCRIPTION - PAIN TYPE: TYPE: ACUTE PAIN

## 2024-04-21 ASSESSMENT — PAIN DESCRIPTION - LOCATION: LOCATION: ARM

## 2024-04-21 ASSESSMENT — PAIN DESCRIPTION - ORIENTATION: ORIENTATION: RIGHT;LEFT

## 2024-04-21 ASSESSMENT — PAIN DESCRIPTION - ONSET: ONSET: PROGRESSIVE

## 2024-04-21 ASSESSMENT — PAIN - FUNCTIONAL ASSESSMENT
PAIN_FUNCTIONAL_ASSESSMENT: 0-10
PAIN_FUNCTIONAL_ASSESSMENT: ACTIVITIES ARE NOT PREVENTED

## 2024-04-21 ASSESSMENT — PAIN DESCRIPTION - DESCRIPTORS: DESCRIPTORS: DISCOMFORT;ITCHING

## 2024-04-21 ASSESSMENT — PAIN DESCRIPTION - FREQUENCY: FREQUENCY: CONTINUOUS

## 2024-04-21 ASSESSMENT — PAIN SCALES - GENERAL: PAINLEVEL_OUTOF10: 3

## 2024-04-21 NOTE — ED NOTES
Injection administered. Pt refused to wait 20 min after receiving an injection stating he has had the medication before without difficulty. Discharge instructions and prescriptions reviewed with pt. Pt verbalized understanding. Pt ambulated out in stable condition.  Assessment unchanged upon discharge.     Candie Luke RN  04/21/24 0380

## 2024-04-21 NOTE — ED TRIAGE NOTES
Pt to urgent care due to poison ivy that started on Thursday. Pt was cutting down a tree in his yard when he was exposed.

## 2024-04-21 NOTE — ED PROVIDER NOTES
[facility] URGENT CARE  UrgentCare Encounter      CHIEFCOMPLAINT       Chief Complaint   Patient presents with    Poison Ivy     Arms bilateral       Nurses Notes reviewed and I agree except as noted in the HPI.  HISTORY OF PRESENT ILLNESS   Angel L Oppenheimer is a 53 y.o. male who presents to urgent care with complaints of a rash to his bilateral upper arms.  He has been out doing lawn work.    REVIEW OF SYSTEMS     Review of Systems    PAST MEDICAL HISTORY   History reviewed. No pertinent past medical history.    SURGICAL HISTORY     Patient  has no past surgical history on file.    CURRENT MEDICATIONS       Discharge Medication List as of 4/21/2024 11:14 AM        CONTINUE these medications which have NOT CHANGED    Details   amLODIPine (NORVASC) 5 MG tablet TAKE 1 TABLET BY MOUTH DAILY, Disp-90 tablet, R-2Normal      diclofenac sodium (VOLTAREN) 1 % GEL Apply 4 g topically 2 times daily Apply to knee, Topical, 2 TIMES DAILY Starting Wed 12/20/2023, Disp-150 g, R-3, Normal      lisinopril (PRINIVIL;ZESTRIL) 10 MG tablet Take 1 tablet by mouth 2 times daily, Disp-180 tablet, R-1Normal             ALLERGIES     Patient is has No Known Allergies.    FAMILY HISTORY     Patient'sfamily history includes Diabetes in his mother and another family member; High Blood Pressure in his mother.    SOCIAL HISTORY     Patient  reports that he has never smoked. He has never used smokeless tobacco. He reports that he does not drink alcohol and does not use drugs.    PHYSICAL EXAM     ED TRIAGE VITALS  BP: (!) 147/91, Temp: 98.5 °F (36.9 °C), Pulse: 58, Respirations: 20, SpO2: 98 %  Physical Exam  Constitutional:       General: He is not in acute distress.     Appearance: He is well-developed. He is not ill-appearing or toxic-appearing.   HENT:      Head: Normocephalic and atraumatic.      Right Ear: Tympanic membrane normal.      Left Ear: Tympanic membrane normal.      Nose: No congestion or rhinorrhea.

## 2024-06-28 RX ORDER — AMLODIPINE BESYLATE 5 MG/1
5 TABLET ORAL DAILY
Qty: 90 TABLET | Refills: 1 | Status: SHIPPED | OUTPATIENT
Start: 2024-06-28

## 2024-07-01 DIAGNOSIS — I10 ESSENTIAL HYPERTENSION: ICD-10-CM

## 2024-07-01 RX ORDER — LISINOPRIL 10 MG/1
10 TABLET ORAL 2 TIMES DAILY
Qty: 180 TABLET | Refills: 1 | Status: SHIPPED | OUTPATIENT
Start: 2024-07-01

## 2024-12-19 ENCOUNTER — OFFICE VISIT (OUTPATIENT)
Dept: FAMILY MEDICINE CLINIC | Age: 54
End: 2024-12-19

## 2024-12-19 VITALS
SYSTOLIC BLOOD PRESSURE: 136 MMHG | HEART RATE: 60 BPM | RESPIRATION RATE: 10 BRPM | WEIGHT: 263 LBS | BODY MASS INDEX: 41.28 KG/M2 | HEIGHT: 67 IN | DIASTOLIC BLOOD PRESSURE: 88 MMHG

## 2024-12-19 DIAGNOSIS — I10 ESSENTIAL HYPERTENSION: ICD-10-CM

## 2024-12-19 DIAGNOSIS — Z00.00 WELL ADULT EXAM: Primary | ICD-10-CM

## 2024-12-19 DIAGNOSIS — Z12.5 SCREENING FOR PROSTATE CANCER: ICD-10-CM

## 2024-12-19 DIAGNOSIS — E78.2 MIXED HYPERLIPIDEMIA: ICD-10-CM

## 2024-12-19 RX ORDER — AMLODIPINE BESYLATE 5 MG/1
5 TABLET ORAL DAILY
Qty: 90 TABLET | Refills: 3 | Status: SHIPPED | OUTPATIENT
Start: 2024-12-19

## 2024-12-19 RX ORDER — LISINOPRIL 10 MG/1
10 TABLET ORAL 2 TIMES DAILY
Qty: 180 TABLET | Refills: 3 | Status: SHIPPED | OUTPATIENT
Start: 2024-12-19

## 2024-12-19 ASSESSMENT — PATIENT HEALTH QUESTIONNAIRE - PHQ9
SUM OF ALL RESPONSES TO PHQ QUESTIONS 1-9: 0
2. FEELING DOWN, DEPRESSED OR HOPELESS: NOT AT ALL
SUM OF ALL RESPONSES TO PHQ9 QUESTIONS 1 & 2: 0
SUM OF ALL RESPONSES TO PHQ QUESTIONS 1-9: 0
1. LITTLE INTEREST OR PLEASURE IN DOING THINGS: NOT AT ALL

## 2024-12-19 ASSESSMENT — ENCOUNTER SYMPTOMS
WHEEZING: 0
SHORTNESS OF BREATH: 0
COUGH: 0
ABDOMINAL DISTENTION: 0
CHEST TIGHTNESS: 0
BACK PAIN: 0
ABDOMINAL PAIN: 0

## 2024-12-19 NOTE — PROGRESS NOTES
[facility] PROFESSIONAL SERV[facility]  [facility]  [address] [address]  Dept: [phone]  Dept Fax: [phone]  Loc: [phone]  PROGRESS NOTE      VisitDate: 12/19/2024    Angel L Oppenheimer is a 54 y.o. male who presents today for:     Chief Complaint   Patient presents with    Annual Exam    Ear Fullness     When he gets water in his ear when washing his hair it will hurt. It does not hurt now, but they are asking what can be done when this happens.     Injections     Flu shot         Subjective:  Annual wellness examination/medication refills history of hypertension hyperlipidemia.  Patient does complain of some right ear tenderness.  Denies any dizziness headache syncope fever chest pain shortness of breath abdominal pain numbness rash or edema.  Reports regular bowel movements no blood or melena.  Denies any dysuria hematuria flank pain.  Nocturia 1-2 times nightly.          Review of Systems   Constitutional:  Negative for activity change, appetite change, chills, fatigue and fever.   HENT:  Positive for ear pain.    Eyes:  Negative for visual disturbance.   Respiratory:  Negative for cough, chest tightness, shortness of breath and wheezing.    Cardiovascular:  Negative for chest pain, palpitations and leg swelling.   Gastrointestinal:  Negative for abdominal distention and abdominal pain.   Genitourinary:  Negative for dysuria.   Musculoskeletal:  Negative for arthralgias, back pain and neck pain.   Skin: Negative.  Negative for rash.   Neurological:  Negative for dizziness, light-headedness and headaches.   Hematological:  Negative for adenopathy.   Psychiatric/Behavioral:  Negative for decreased concentration and dysphoric mood.    All other systems reviewed and are negative.    History reviewed. No pertinent past medical history.   History reviewed. No pertinent surgical history.  Family History   Problem Relation Age of Onset    Diabetes Other         lot

## 2024-12-19 NOTE — PROGRESS NOTES
Immunization(s) given during visit:    Immunizations Administered       Name Date Dose Route    Influenza, FLUCELVAX, (age 6 mo+) IM, Trivalent PF, 0.5mL 12/19/2024 0.5 mL Intramuscular    Site: Deltoid- Left    Lot: [id number]    NDC: 88820-204-11

## 2024-12-23 RX ORDER — AMLODIPINE BESYLATE 5 MG/1
5 TABLET ORAL DAILY
Qty: 90 TABLET | Refills: 3 | Status: SHIPPED | OUTPATIENT
Start: 2024-12-23

## 2024-12-25 LAB
ALBUMIN: 4.2 G/DL (ref 3.5–5.2)
ALK PHOSPHATASE: 72 U/L (ref 39–118)
ALT SERPL-CCNC: 15 U/L (ref 5–41)
ANION GAP SERPL CALCULATED.3IONS-SCNC: 10 MMOL/L (ref 7–16)
AST SERPL-CCNC: 13 U/L (ref 9–50)
BILIRUB SERPL-MCNC: 0.4 MG/DL
BUN BLDV-MCNC: 13 MG/DL (ref 6–20)
CALCIUM SERPL-MCNC: 9.2 MG/DL (ref 8.6–10.5)
CHLORIDE BLD-SCNC: 101 MMOL/L (ref 96–107)
CHOLESTEROL, TOTAL: 173 MG/DL (ref 100–199)
CHOLESTEROL/HDL RATIO: 4.9 (ref 2–4.5)
CO2: 30 MMOL/L (ref 18–32)
CREAT SERPL-MCNC: 0.95 MG/DL (ref 0.67–1.3)
EGFR IF NONAFRICAN AMERICAN: 94 ML/MIN/1.73M2
GLUCOSE: 91 MG/DL (ref 70–100)
HDLC SERPL-MCNC: 35 MG/DL
LDL CHOLESTEROL: 109 MG/DL
LDL/HDL RATIO: 3.1
POTASSIUM SERPL-SCNC: 4.6 MMOL/L (ref 3.5–5.4)
PSA, ULTRASENSITIVE: 0.67 NG/ML (ref 0–4)
SODIUM BLD-SCNC: 141 MMOL/L (ref 135–148)
TOTAL PROTEIN: 7.4 G/DL (ref 6–8.3)
TRIGL SERPL-MCNC: 146 MG/DL (ref 20–149)
VLDLC SERPL CALC-MCNC: 29 MG/DL

## 2024-12-27 ENCOUNTER — TELEPHONE (OUTPATIENT)
Dept: FAMILY MEDICINE CLINIC | Age: 54
End: 2024-12-27

## 2024-12-27 NOTE — TELEPHONE ENCOUNTER
----- Message from JUAN Sexton - CNP sent at 12/27/2024  8:07 AM EST -----  CMP FLP PSA within normal limits/okay

## 2025-05-28 ENCOUNTER — APPOINTMENT (OUTPATIENT)
Dept: GENERAL RADIOLOGY | Age: 55
End: 2025-05-28
Payer: COMMERCIAL

## 2025-05-28 ENCOUNTER — HOSPITAL ENCOUNTER (EMERGENCY)
Age: 55
Discharge: HOME | End: 2025-05-28
Payer: COMMERCIAL

## 2025-05-28 VITALS
SYSTOLIC BLOOD PRESSURE: 156 MMHG | WEIGHT: 240 LBS | BODY MASS INDEX: 37.67 KG/M2 | HEART RATE: 61 BPM | HEIGHT: 67 IN | RESPIRATION RATE: 17 BRPM | OXYGEN SATURATION: 96 % | TEMPERATURE: 97.8 F | DIASTOLIC BLOOD PRESSURE: 108 MMHG

## 2025-05-28 DIAGNOSIS — M17.11 PRIMARY OSTEOARTHRITIS OF RIGHT KNEE: Primary | ICD-10-CM

## 2025-05-28 DIAGNOSIS — M25.461 KNEE EFFUSION, RIGHT: ICD-10-CM

## 2025-05-28 DIAGNOSIS — M25.561 ACUTE PAIN OF RIGHT KNEE: ICD-10-CM

## 2025-05-28 PROCEDURE — 6370000000 HC RX 637 (ALT 250 FOR IP): Performed by: NURSE PRACTITIONER

## 2025-05-28 PROCEDURE — 73564 X-RAY EXAM KNEE 4 OR MORE: CPT

## 2025-05-28 PROCEDURE — 99283 EMERGENCY DEPT VISIT LOW MDM: CPT

## 2025-05-28 RX ORDER — LIDOCAINE 50 MG/G
1 PATCH TOPICAL DAILY
Qty: 30 PATCH | Refills: 0 | Status: SHIPPED | OUTPATIENT
Start: 2025-05-28

## 2025-05-28 RX ORDER — HYDROCODONE BITARTRATE AND ACETAMINOPHEN 5; 325 MG/1; MG/1
1 TABLET ORAL ONCE
Status: COMPLETED | OUTPATIENT
Start: 2025-05-29 | End: 2025-05-28

## 2025-05-28 RX ORDER — METHYLPREDNISOLONE 4 MG/1
TABLET ORAL
Qty: 1 KIT | Refills: 0 | Status: SHIPPED | OUTPATIENT
Start: 2025-05-28 | End: 2025-06-03

## 2025-05-28 RX ORDER — LIDOCAINE 4 G/G
1 PATCH TOPICAL ONCE
Status: DISCONTINUED | OUTPATIENT
Start: 2025-05-29 | End: 2025-05-29 | Stop reason: HOSPADM

## 2025-05-28 RX ORDER — ETODOLAC 300 MG/1
300 CAPSULE ORAL EVERY 8 HOURS
Qty: 30 CAPSULE | Refills: 0 | Status: SHIPPED | OUTPATIENT
Start: 2025-05-28

## 2025-05-28 RX ADMIN — HYDROCODONE BITARTRATE AND ACETAMINOPHEN 1 TABLET: 5; 325 TABLET ORAL at 23:43

## 2025-05-28 ASSESSMENT — PAIN SCALES - GENERAL: PAINLEVEL_OUTOF10: 9

## 2025-05-28 ASSESSMENT — PAIN - FUNCTIONAL ASSESSMENT: PAIN_FUNCTIONAL_ASSESSMENT: 0-10

## 2025-05-29 NOTE — ED TRIAGE NOTES
Pt present to the ED from home with c/c of right knee pain. Pt states the pain started last Wednesday. Pt denies any trauma to the area. Pt states he has not had pain like this before. Pt also reports Advil and icing the knee have not provided pain relief.

## 2025-05-31 NOTE — ED PROVIDER NOTES
[facility] EMERGENCY DEPARTMENT      EMERGENCY MEDICINE     Pt Name: Angel L Oppenheimer  MRN: [id number]  Birthdate 1970  Date of evaluation: 2025  Provider: JUAN Whitley CNP    CHIEF COMPLAINT       Chief Complaint   Patient presents with    Knee Pain     Right knee     HISTORY OF PRESENT ILLNESS   Angel L Oppenheimer is a pleasant 54 y.o. male who presents to the emergency department from home with c/o right knee pain.  Denies known injury but it became swollen and very painful.  Symptoms started last Wednesday.  Been taking Motrin and icing the area.      History is obtained from:  patient  PASTMEDICAL HISTORY   History reviewed. No pertinent past medical history.    There is no problem list on file for this patient.    SURGICAL HISTORY     History reviewed. No pertinent surgical history.    CURRENT MEDICATIONS       Discharge Medication List as of 2025 11:39 PM        CONTINUE these medications which have NOT CHANGED    Details   amLODIPine (NORVASC) 5 MG tablet TAKE 1 TABLET BY MOUTH DAILY, Disp-90 tablet, R-3Normal      lisinopril (PRINIVIL;ZESTRIL) 10 MG tablet Take 1 tablet by mouth 2 times daily, Disp-180 tablet, R-3Normal      diclofenac sodium (VOLTAREN) 1 % GEL Apply 4 g topically 2 times daily Apply to knee, Topical, 2 TIMES DAILY Starting 2023, Disp-150 g, R-3, Normal             ALLERGIES     has no known allergies.    FAMILY HISTORY     He indicated that his mother is alive. He indicated that his father is . He indicated that the status of his other is unknown.       SOCIAL HISTORY       Social History     Tobacco Use    Smoking status: Never    Smokeless tobacco: Never   Vaping Use    Vaping status: Never Used   Substance Use Topics    Alcohol use: No    Drug use: No       PHYSICAL EXAM       ED Triage Vitals [25 2151]   BP Systolic BP Percentile Diastolic BP Percentile Temp Temp Source Pulse Respirations SpO2   (!) 159/105 -- -- 97.8 °F

## 2025-06-02 ENCOUNTER — HOSPITAL ENCOUNTER (OUTPATIENT)
Dept: INTERVENTIONAL RADIOLOGY/VASCULAR | Age: 55
Discharge: HOME | End: 2025-06-04
Attending: ORTHOPAEDIC SURGERY
Payer: COMMERCIAL

## 2025-06-02 ENCOUNTER — TRANSCRIBE ORDERS (OUTPATIENT)
Dept: ADMINISTRATIVE | Age: 55
End: 2025-06-02

## 2025-06-02 DIAGNOSIS — M79.661 PAIN OF RIGHT LOWER LEG: Primary | ICD-10-CM

## 2025-06-02 DIAGNOSIS — M79.89 SWELLING OF LIMB: ICD-10-CM

## 2025-06-02 DIAGNOSIS — M79.661 PAIN OF RIGHT LOWER LEG: ICD-10-CM

## 2025-06-02 PROCEDURE — 93971 EXTREMITY STUDY: CPT

## 2025-08-24 DIAGNOSIS — I10 ESSENTIAL HYPERTENSION: ICD-10-CM

## 2025-08-25 RX ORDER — LISINOPRIL 10 MG/1
10 TABLET ORAL 2 TIMES DAILY
Qty: 180 TABLET | Refills: 0 | Status: SHIPPED | OUTPATIENT
Start: 2025-08-25